# Patient Record
Sex: MALE | Race: WHITE | NOT HISPANIC OR LATINO | Employment: OTHER | URBAN - METROPOLITAN AREA
[De-identification: names, ages, dates, MRNs, and addresses within clinical notes are randomized per-mention and may not be internally consistent; named-entity substitution may affect disease eponyms.]

---

## 2023-03-01 LAB
ALANINE AMINOTRANSFERASE (SGPT) (U/L) IN SER/PLAS: 22 U/L (ref 10–52)
ALBUMIN (G/DL) IN SER/PLAS: 4.5 G/DL (ref 3.4–5)
ALKALINE PHOSPHATASE (U/L) IN SER/PLAS: 80 U/L (ref 33–136)
ANION GAP IN SER/PLAS: 14 MMOL/L (ref 10–20)
APPEARANCE, URINE: ABNORMAL
ASPARTATE AMINOTRANSFERASE (SGOT) (U/L) IN SER/PLAS: 24 U/L (ref 9–39)
BASOPHILS (10*3/UL) IN BLOOD BY AUTOMATED COUNT: 0.03 X10E9/L (ref 0–0.1)
BASOPHILS/100 LEUKOCYTES IN BLOOD BY AUTOMATED COUNT: 0.6 % (ref 0–2)
BILIRUBIN TOTAL (MG/DL) IN SER/PLAS: 0.9 MG/DL (ref 0–1.2)
BILIRUBIN, URINE: NEGATIVE
BLOOD, URINE: NEGATIVE
CALCIDIOL (25 OH VITAMIN D3) (NG/ML) IN SER/PLAS: 65 NG/ML
CALCIUM (MG/DL) IN SER/PLAS: 9.5 MG/DL (ref 8.6–10.6)
CARBON DIOXIDE, TOTAL (MMOL/L) IN SER/PLAS: 26 MMOL/L (ref 21–32)
CHLORIDE (MMOL/L) IN SER/PLAS: 107 MMOL/L (ref 98–107)
CHOLESTEROL (MG/DL) IN SER/PLAS: 244 MG/DL (ref 0–199)
CHOLESTEROL IN HDL (MG/DL) IN SER/PLAS: 50.3 MG/DL
CHOLESTEROL/HDL RATIO: 4.9
COBALAMIN (VITAMIN B12) (PG/ML) IN SER/PLAS: 586 PG/ML (ref 211–911)
COLOR, URINE: ABNORMAL
CREATININE (MG/DL) IN SER/PLAS: 1 MG/DL (ref 0.5–1.3)
EOSINOPHILS (10*3/UL) IN BLOOD BY AUTOMATED COUNT: 0.29 X10E9/L (ref 0–0.7)
EOSINOPHILS/100 LEUKOCYTES IN BLOOD BY AUTOMATED COUNT: 5.6 % (ref 0–6)
ERYTHROCYTE DISTRIBUTION WIDTH (RATIO) BY AUTOMATED COUNT: 12.3 % (ref 11.5–14.5)
ERYTHROCYTE MEAN CORPUSCULAR HEMOGLOBIN CONCENTRATION (G/DL) BY AUTOMATED: 33.8 G/DL (ref 32–36)
ERYTHROCYTE MEAN CORPUSCULAR VOLUME (FL) BY AUTOMATED COUNT: 94 FL (ref 80–100)
ERYTHROCYTES (10*6/UL) IN BLOOD BY AUTOMATED COUNT: 4.87 X10E12/L (ref 4.5–5.9)
FOLATE (NG/ML) IN SER/PLAS: >24 NG/ML
GFR MALE: 84 ML/MIN/1.73M2
GLUCOSE (MG/DL) IN SER/PLAS: 92 MG/DL (ref 74–99)
GLUCOSE, URINE: NEGATIVE MG/DL
HEMATOCRIT (%) IN BLOOD BY AUTOMATED COUNT: 45.8 % (ref 41–52)
HEMOGLOBIN (G/DL) IN BLOOD: 15.5 G/DL (ref 13.5–17.5)
IMMATURE GRANULOCYTES/100 LEUKOCYTES IN BLOOD BY AUTOMATED COUNT: 0.2 % (ref 0–0.9)
KETONES, URINE: ABNORMAL MG/DL
LDL: 161 MG/DL (ref 0–99)
LEUKOCYTE ESTERASE, URINE: NEGATIVE
LEUKOCYTES (10*3/UL) IN BLOOD BY AUTOMATED COUNT: 5.2 X10E9/L (ref 4.4–11.3)
LYMPHOCYTES (10*3/UL) IN BLOOD BY AUTOMATED COUNT: 1.84 X10E9/L (ref 1.2–4.8)
LYMPHOCYTES/100 LEUKOCYTES IN BLOOD BY AUTOMATED COUNT: 35.3 % (ref 13–44)
MONOCYTES (10*3/UL) IN BLOOD BY AUTOMATED COUNT: 0.39 X10E9/L (ref 0.1–1)
MONOCYTES/100 LEUKOCYTES IN BLOOD BY AUTOMATED COUNT: 7.5 % (ref 2–10)
NEUTROPHILS (10*3/UL) IN BLOOD BY AUTOMATED COUNT: 2.65 X10E9/L (ref 1.2–7.7)
NEUTROPHILS/100 LEUKOCYTES IN BLOOD BY AUTOMATED COUNT: 50.8 % (ref 40–80)
NITRITE, URINE: NEGATIVE
NRBC (PER 100 WBCS) BY AUTOMATED COUNT: 0 /100 WBC (ref 0–0)
PH, URINE: 5 (ref 5–8)
PLATELETS (10*3/UL) IN BLOOD AUTOMATED COUNT: 211 X10E9/L (ref 150–450)
POTASSIUM (MMOL/L) IN SER/PLAS: 4.3 MMOL/L (ref 3.5–5.3)
PROSTATE SPECIFIC AG (NG/ML) IN SER/PLAS: 1.1 NG/ML (ref 0–4)
PROTEIN TOTAL: 6.9 G/DL (ref 6.4–8.2)
PROTEIN, URINE: NEGATIVE MG/DL
SODIUM (MMOL/L) IN SER/PLAS: 143 MMOL/L (ref 136–145)
SPECIFIC GRAVITY, URINE: 1.03 (ref 1–1.03)
THYROTROPIN (MIU/L) IN SER/PLAS BY DETECTION LIMIT <= 0.05 MIU/L: 2.32 MIU/L (ref 0.44–3.98)
TRIGLYCERIDE (MG/DL) IN SER/PLAS: 164 MG/DL (ref 0–149)
UREA NITROGEN (MG/DL) IN SER/PLAS: 19 MG/DL (ref 6–23)
UROBILINOGEN, URINE: <2 MG/DL (ref 0–1.9)
VLDL: 33 MG/DL (ref 0–40)

## 2023-04-03 LAB
ALANINE AMINOTRANSFERASE (SGPT) (U/L) IN SER/PLAS: 16 U/L (ref 10–52)
ALBUMIN (G/DL) IN SER/PLAS: 4.2 G/DL (ref 3.4–5)
ALKALINE PHOSPHATASE (U/L) IN SER/PLAS: 65 U/L (ref 33–136)
ANION GAP IN SER/PLAS: 10 MMOL/L (ref 10–20)
ASPARTATE AMINOTRANSFERASE (SGOT) (U/L) IN SER/PLAS: 19 U/L (ref 9–39)
BILIRUBIN TOTAL (MG/DL) IN SER/PLAS: 0.7 MG/DL (ref 0–1.2)
CALCIDIOL (25 OH VITAMIN D3) (NG/ML) IN SER/PLAS: 57 NG/ML
CALCIUM (MG/DL) IN SER/PLAS: 9.2 MG/DL (ref 8.6–10.3)
CARBON DIOXIDE, TOTAL (MMOL/L) IN SER/PLAS: 27 MMOL/L (ref 21–32)
CHLORIDE (MMOL/L) IN SER/PLAS: 106 MMOL/L (ref 98–107)
CREATININE (MG/DL) IN SER/PLAS: 0.9 MG/DL (ref 0.5–1.3)
GFR MALE: >90 ML/MIN/1.73M2
GLUCOSE (MG/DL) IN SER/PLAS: 83 MG/DL (ref 74–99)
POTASSIUM (MMOL/L) IN SER/PLAS: 4.3 MMOL/L (ref 3.5–5.3)
PROTEIN TOTAL: 6.4 G/DL (ref 6.4–8.2)
SODIUM (MMOL/L) IN SER/PLAS: 139 MMOL/L (ref 136–145)
THYROTROPIN (MIU/L) IN SER/PLAS BY DETECTION LIMIT <= 0.05 MIU/L: 3.16 MIU/L (ref 0.44–3.98)
THYROXINE (T4) FREE (NG/DL) IN SER/PLAS: 0.9 NG/DL (ref 0.61–1.12)
TRIIODOTHYRONINE (T3) FREE (PG/ML) IN SER/PLAS: 3.5 PG/ML (ref 2.3–4.2)
UREA NITROGEN (MG/DL) IN SER/PLAS: 21 MG/DL (ref 6–23)

## 2023-04-27 ENCOUNTER — TELEPHONE (OUTPATIENT)
Dept: PRIMARY CARE | Facility: CLINIC | Age: 63
End: 2023-04-27
Payer: COMMERCIAL

## 2023-04-27 DIAGNOSIS — B35.6 TINEA CRURIS: Primary | ICD-10-CM

## 2023-04-27 DIAGNOSIS — Z00.00 ROUTINE GENERAL MEDICAL EXAMINATION AT A HEALTH CARE FACILITY: ICD-10-CM

## 2023-04-27 PROBLEM — N40.0 BPH (BENIGN PROSTATIC HYPERPLASIA): Status: ACTIVE | Noted: 2023-04-27

## 2023-04-27 PROBLEM — R09.89 THROAT FULLNESS: Status: ACTIVE | Noted: 2023-04-27

## 2023-04-27 PROBLEM — E06.9 THYROIDITIS: Status: ACTIVE | Noted: 2023-04-27

## 2023-04-27 PROBLEM — I48.91 ATRIAL FIBRILLATION (MULTI): Status: ACTIVE | Noted: 2023-04-27

## 2023-04-27 PROBLEM — M79.2 NEUROPATHIC PAIN: Status: ACTIVE | Noted: 2023-04-27

## 2023-04-27 PROBLEM — E55.9 VITAMIN D DEFICIENCY: Status: ACTIVE | Noted: 2023-04-27

## 2023-04-27 PROBLEM — U07.1 COVID-19: Status: ACTIVE | Noted: 2023-04-27

## 2023-04-27 PROBLEM — R53.83 FATIGUE: Status: ACTIVE | Noted: 2023-04-27

## 2023-04-27 PROBLEM — E78.5 HYPERLIPIDEMIA: Status: ACTIVE | Noted: 2023-04-27

## 2023-04-27 PROBLEM — E05.90 HYPERTHYROIDISM: Status: ACTIVE | Noted: 2023-04-27

## 2023-04-27 PROBLEM — I35.1 MODERATE AORTIC VALVE INSUFFICIENCY: Status: ACTIVE | Noted: 2023-04-27

## 2023-04-27 PROBLEM — G47.33 OSA (OBSTRUCTIVE SLEEP APNEA): Status: ACTIVE | Noted: 2023-04-27

## 2023-04-27 PROBLEM — J06.9 VIRAL UPPER RESPIRATORY TRACT INFECTION: Status: ACTIVE | Noted: 2023-04-27

## 2023-04-27 PROBLEM — T78.40XA ALLERGIES: Status: ACTIVE | Noted: 2023-04-27

## 2023-04-27 PROBLEM — M19.90 INFLAMMATORY ARTHRITIS: Status: ACTIVE | Noted: 2023-04-27

## 2023-04-27 PROBLEM — E53.8 VITAMIN B 12 DEFICIENCY: Status: ACTIVE | Noted: 2023-04-27

## 2023-04-27 PROBLEM — K21.9 LARYNGOPHARYNGEAL REFLUX: Status: ACTIVE | Noted: 2023-04-27

## 2023-04-27 PROBLEM — M79.7 FIBROMYALGIA: Status: ACTIVE | Noted: 2023-04-27

## 2023-04-27 PROBLEM — R00.1 BRADYCARDIA, SINUS: Status: ACTIVE | Noted: 2023-04-27

## 2023-04-27 PROBLEM — Z20.822 SUSPECTED COVID-19 VIRUS INFECTION: Status: ACTIVE | Noted: 2023-04-27

## 2023-04-27 PROBLEM — I48.0 PAROXYSMAL ATRIAL FIBRILLATION (MULTI): Status: ACTIVE | Noted: 2023-04-27

## 2023-04-27 PROBLEM — J35.1 LINGUAL TONSIL HYPERTROPHY: Status: ACTIVE | Noted: 2023-04-27

## 2023-04-27 PROBLEM — R22.42 SUBCUTANEOUS MASS OF LEFT LOWER EXTREMITY: Status: ACTIVE | Noted: 2023-04-27

## 2023-04-27 PROBLEM — S81.819A LACERATION OF LEG: Status: ACTIVE | Noted: 2023-04-27

## 2023-04-27 PROBLEM — J02.9 SORE THROAT: Status: ACTIVE | Noted: 2023-04-27

## 2023-04-27 PROBLEM — J02.9 ACUTE VIRAL PHARYNGITIS: Status: ACTIVE | Noted: 2023-04-27

## 2023-04-27 RX ORDER — CEPHALEXIN 500 MG/1
500 CAPSULE ORAL 3 TIMES DAILY
COMMUNITY
Start: 2022-12-16 | End: 2023-04-27 | Stop reason: SDUPTHER

## 2023-04-27 RX ORDER — ECONAZOLE NITRATE 10 MG/G
1 CREAM TOPICAL 2 TIMES DAILY
Qty: 6 G | Refills: 0 | Status: SHIPPED | OUTPATIENT
Start: 2023-04-27 | End: 2023-05-27

## 2023-04-27 RX ORDER — ECONAZOLE NITRATE 10 MG/G
1 CREAM TOPICAL 2 TIMES DAILY
COMMUNITY
Start: 2022-12-16 | End: 2023-04-27 | Stop reason: SDUPTHER

## 2023-04-27 RX ORDER — CEPHALEXIN 500 MG/1
500 CAPSULE ORAL 3 TIMES DAILY
Qty: 30 CAPSULE | Refills: 0 | Status: SHIPPED | OUTPATIENT
Start: 2023-04-27 | End: 2023-05-07

## 2023-04-27 RX ORDER — MESALAMINE 400 MG/1
CAPSULE, DELAYED RELEASE ORAL
COMMUNITY
Start: 2023-03-30

## 2023-04-27 RX ORDER — EPINEPHRINE 0.3 MG/.3ML
0.3 INJECTION SUBCUTANEOUS
COMMUNITY
Start: 2015-12-01

## 2023-04-27 RX ORDER — ASPIRIN 81 MG/1
1 TABLET ORAL DAILY
COMMUNITY

## 2023-06-26 ENCOUNTER — TELEPHONE (OUTPATIENT)
Dept: PRIMARY CARE | Facility: CLINIC | Age: 63
End: 2023-06-26
Payer: COMMERCIAL

## 2023-10-03 ENCOUNTER — LAB (OUTPATIENT)
Dept: LAB | Facility: LAB | Age: 63
End: 2023-10-03
Payer: COMMERCIAL

## 2023-10-03 DIAGNOSIS — E78.5 HYPERLIPIDEMIA, UNSPECIFIED: ICD-10-CM

## 2023-10-03 DIAGNOSIS — E03.9 HYPOTHYROIDISM, UNSPECIFIED: ICD-10-CM

## 2023-10-03 DIAGNOSIS — E11.9 TYPE 2 DIABETES MELLITUS WITHOUT COMPLICATIONS (MULTI): Primary | ICD-10-CM

## 2023-10-03 DIAGNOSIS — E55.9 VITAMIN D DEFICIENCY, UNSPECIFIED: ICD-10-CM

## 2023-10-03 LAB
25(OH)D3 SERPL-MCNC: 55 NG/ML (ref 30–100)
ALBUMIN SERPL BCP-MCNC: 3.9 G/DL (ref 3.4–5)
ALP SERPL-CCNC: 71 U/L (ref 33–136)
ALT SERPL W P-5'-P-CCNC: 21 U/L (ref 10–52)
ANION GAP SERPL CALC-SCNC: 12 MMOL/L (ref 10–20)
AST SERPL W P-5'-P-CCNC: 18 U/L (ref 9–39)
BILIRUB SERPL-MCNC: 0.9 MG/DL (ref 0–1.2)
BUN SERPL-MCNC: 24 MG/DL (ref 6–23)
CALCIUM SERPL-MCNC: 9.1 MG/DL (ref 8.6–10.3)
CHLORIDE SERPL-SCNC: 104 MMOL/L (ref 98–107)
CHOLEST SERPL-MCNC: 217 MG/DL (ref 0–199)
CHOLESTEROL/HDL RATIO: 3.7
CO2 SERPL-SCNC: 27 MMOL/L (ref 21–32)
CREAT SERPL-MCNC: 1.05 MG/DL (ref 0.5–1.3)
EST. AVERAGE GLUCOSE BLD GHB EST-MCNC: 111 MG/DL
GFR SERPL CREATININE-BSD FRML MDRD: 79 ML/MIN/1.73M*2
GLUCOSE SERPL-MCNC: 93 MG/DL (ref 74–99)
HBA1C MFR BLD: 5.5 %
HDLC SERPL-MCNC: 58.7 MG/DL
LDLC SERPL CALC-MCNC: 121 MG/DL (ref 140–190)
NON HDL CHOLESTEROL: 158 MG/DL (ref 0–149)
POTASSIUM SERPL-SCNC: 4.5 MMOL/L (ref 3.5–5.3)
PROT SERPL-MCNC: 5.9 G/DL (ref 6.4–8.2)
SODIUM SERPL-SCNC: 138 MMOL/L (ref 136–145)
T3FREE SERPL-MCNC: 3 PG/ML (ref 2.3–4.2)
T4 FREE SERPL-MCNC: 1.11 NG/DL (ref 0.61–1.12)
TRIGL SERPL-MCNC: 185 MG/DL (ref 0–149)
TSH SERPL-ACNC: 1.19 MIU/L (ref 0.44–3.98)
VLDL: 37 MG/DL (ref 0–40)

## 2023-10-03 PROCEDURE — 36415 COLL VENOUS BLD VENIPUNCTURE: CPT

## 2023-11-08 ENCOUNTER — APPOINTMENT (OUTPATIENT)
Dept: ORTHOPEDIC SURGERY | Facility: CLINIC | Age: 63
End: 2023-11-08
Payer: COMMERCIAL

## 2024-01-29 ENCOUNTER — TELEPHONE (OUTPATIENT)
Dept: CARDIOLOGY | Facility: CLINIC | Age: 64
End: 2024-01-29
Payer: MEDICARE

## 2024-01-29 DIAGNOSIS — I35.1 MODERATE AORTIC VALVE INSUFFICIENCY: ICD-10-CM

## 2024-01-29 NOTE — TELEPHONE ENCOUNTER
Patient called in and scheduled his yearly follow up.  He was also trying to schedule his Echo but there is not an order is the system.

## 2024-02-07 ENCOUNTER — OFFICE VISIT (OUTPATIENT)
Dept: ORTHOPEDIC SURGERY | Facility: CLINIC | Age: 64
End: 2024-02-07
Payer: MEDICARE

## 2024-02-07 VITALS — WEIGHT: 170 LBS | BODY MASS INDEX: 24.34 KG/M2 | HEIGHT: 70 IN

## 2024-02-07 DIAGNOSIS — M23.90 INTERNAL DERANGEMENT OF KNEE, UNSPECIFIED LATERALITY: Primary | ICD-10-CM

## 2024-02-07 PROCEDURE — 99213 OFFICE O/P EST LOW 20 MIN: CPT | Performed by: ORTHOPAEDIC SURGERY

## 2024-02-07 PROCEDURE — 1036F TOBACCO NON-USER: CPT | Performed by: ORTHOPAEDIC SURGERY

## 2024-02-07 PROCEDURE — 1159F MED LIST DOCD IN RCRD: CPT | Performed by: ORTHOPAEDIC SURGERY

## 2024-02-07 PROCEDURE — 1125F AMNT PAIN NOTED PAIN PRSNT: CPT | Performed by: ORTHOPAEDIC SURGERY

## 2024-02-07 ASSESSMENT — PAIN DESCRIPTION - DESCRIPTORS: DESCRIPTORS: ACHING

## 2024-02-07 ASSESSMENT — PAIN - FUNCTIONAL ASSESSMENT: PAIN_FUNCTIONAL_ASSESSMENT: 0-10

## 2024-02-07 ASSESSMENT — PAIN SCALES - GENERAL: PAINLEVEL_OUTOF10: 3

## 2024-02-07 NOTE — PROGRESS NOTES
Returns for left knee.  Still having pain in the posterior lateral aspect the knee.  Did feel better on the prednisone.  Did feel Better while sick with the flu and unable to be that active.  Pain is returned and limiting daily activities.    Exam: Unchanged.    Assessment: Posterior lateral knee pain question meniscal tear/internal derangement.    Plan: Discussed nonoperative and operative options in detail.   Risk and benefits discussed in detail. All questions answered today.  Recovery timeline and expectations discussed in detail.  X-rays are pretty unremarkable.  Recommend MRI to evaluate meniscus for possible arthroscopy.  Consider injections depending on MRI results.

## 2024-02-16 ENCOUNTER — HOSPITAL ENCOUNTER (OUTPATIENT)
Dept: RADIOLOGY | Facility: CLINIC | Age: 64
Discharge: HOME | End: 2024-02-16
Payer: MEDICARE

## 2024-02-16 DIAGNOSIS — M23.90 INTERNAL DERANGEMENT OF KNEE, UNSPECIFIED LATERALITY: ICD-10-CM

## 2024-02-16 PROCEDURE — 73721 MRI JNT OF LWR EXTRE W/O DYE: CPT | Mod: LEFT SIDE | Performed by: RADIOLOGY

## 2024-02-16 PROCEDURE — 73721 MRI JNT OF LWR EXTRE W/O DYE: CPT | Mod: LT

## 2024-02-28 ENCOUNTER — OFFICE VISIT (OUTPATIENT)
Dept: ORTHOPEDIC SURGERY | Facility: CLINIC | Age: 64
End: 2024-02-28
Payer: MEDICARE

## 2024-02-28 ENCOUNTER — PREP FOR PROCEDURE (OUTPATIENT)
Dept: ORTHOPEDIC SURGERY | Facility: CLINIC | Age: 64
End: 2024-02-28

## 2024-02-28 VITALS — BODY MASS INDEX: 25.05 KG/M2 | HEIGHT: 70 IN | WEIGHT: 175 LBS

## 2024-02-28 DIAGNOSIS — M23.92 INTERNAL DERANGEMENT OF LEFT KNEE: Primary | ICD-10-CM

## 2024-02-28 DIAGNOSIS — M23.90 INTERNAL DERANGEMENT OF KNEE, UNSPECIFIED LATERALITY: Primary | ICD-10-CM

## 2024-02-28 PROCEDURE — 1125F AMNT PAIN NOTED PAIN PRSNT: CPT | Performed by: ORTHOPAEDIC SURGERY

## 2024-02-28 PROCEDURE — 1036F TOBACCO NON-USER: CPT | Performed by: ORTHOPAEDIC SURGERY

## 2024-02-28 PROCEDURE — 99214 OFFICE O/P EST MOD 30 MIN: CPT | Performed by: ORTHOPAEDIC SURGERY

## 2024-02-28 PROCEDURE — 1159F MED LIST DOCD IN RCRD: CPT | Performed by: ORTHOPAEDIC SURGERY

## 2024-02-28 RX ORDER — SODIUM CHLORIDE, SODIUM LACTATE, POTASSIUM CHLORIDE, CALCIUM CHLORIDE 600; 310; 30; 20 MG/100ML; MG/100ML; MG/100ML; MG/100ML
100 INJECTION, SOLUTION INTRAVENOUS CONTINUOUS
Status: CANCELLED | OUTPATIENT
Start: 2024-02-28

## 2024-02-28 RX ORDER — CEFAZOLIN SODIUM 2 G/100ML
2 INJECTION, SOLUTION INTRAVENOUS ONCE
Status: CANCELLED | OUTPATIENT
Start: 2024-02-28 | End: 2024-02-28

## 2024-02-28 ASSESSMENT — PAIN SCALES - GENERAL: PAINLEVEL: 3

## 2024-02-28 NOTE — PROGRESS NOTES
Here for MRI follow-up.  Still having persistent pain in the left knee.  Has been going on for about 10 months.    Exam: Unchanged.    I personally reviewed the following radiographic exams: MRI shows very mild arthrosis medial compartment.  Normal meniscus.  Mild effusion.    Assessment: Internal derangement left knee.    Plan: Discussed nonoperative and operative options in detail.   Risk and benefits discussed in detail. All questions answered today.  Recovery timeline and expectations discussed in detail.  Discussed knee arthroscopy.  Do not feel he has enough arthritis or fusion for an injection of cortisone or gel.  I think arthroscopy for likely plica or some mechanical issue in the knee is very reasonable.  We discussed postop recovery.  Left knee arthroscopy 90675. Crutches. Outpatient. ANesthesia: General and local.  Crutches.  30 minutes

## 2024-02-28 NOTE — H&P
History Of Present Illness  Cali Jones is a 63 y.o. male presenting with knee pain.     Past Medical History  He has a past medical history of Noninfective gastroenteritis and colitis, unspecified, Other specified personal risk factors, not elsewhere classified (08/01/2014), Personal history of other diseases of the nervous system and sense organs, and Streptococcal pharyngitis (11/30/2018).    Surgical History  He has a past surgical history that includes Hemorrhoid surgery (07/31/2014); Other surgical history (07/31/2014); and Other surgical history (07/31/2014).     Social History  He reports that he has never smoked. He has never been exposed to tobacco smoke. He has never used smokeless tobacco. He reports that he does not currently use alcohol. He reports that he does not use drugs.    Family History  No family history on file.     Allergies  Bee venom protein (honey bee)    Review of Systems     Physical Exam     Last Recorded Vitals  There were no vitals taken for this visit.    Relevant Results      Scheduled medications    Continuous medications    PRN medications    No results found for this or any previous visit (from the past 24 hour(s)).    Assessment/Plan   Internal derangement left knee    Plan knee arthroscopy.       José Luis Dotson MD

## 2024-03-01 PROBLEM — M23.92 INTERNAL DERANGEMENT OF LEFT KNEE: Status: ACTIVE | Noted: 2024-02-28

## 2024-03-06 ENCOUNTER — APPOINTMENT (OUTPATIENT)
Dept: ORTHOPEDIC SURGERY | Facility: CLINIC | Age: 64
End: 2024-03-06
Payer: MEDICARE

## 2024-03-07 ENCOUNTER — TELEMEDICINE CLINICAL SUPPORT (OUTPATIENT)
Dept: PREADMISSION TESTING | Facility: HOSPITAL | Age: 64
End: 2024-03-07
Payer: MEDICARE

## 2024-03-07 DIAGNOSIS — M23.92 INTERNAL DERANGEMENT OF LEFT KNEE: ICD-10-CM

## 2024-03-07 RX ORDER — ASCORBIC ACID 1000 MG
175 TABLET ORAL DAILY
COMMUNITY

## 2024-03-07 RX ORDER — VIT C/E/ZN/COPPR/LUTEIN/ZEAXAN 250MG-90MG
25 CAPSULE ORAL DAILY
COMMUNITY

## 2024-03-07 RX ORDER — PREDNISONE 5 MG/1
5 TABLET ORAL AS NEEDED
COMMUNITY

## 2024-03-07 RX ORDER — ZINC GLUCONATE 50 MG
TABLET ORAL DAILY
COMMUNITY

## 2024-03-07 RX ORDER — BISMUTH SUBSALICYLATE 262 MG
1 TABLET,CHEWABLE ORAL DAILY
COMMUNITY

## 2024-03-07 RX ORDER — ASCORBIC ACID 500 MG
500 TABLET ORAL DAILY
COMMUNITY

## 2024-03-08 ENCOUNTER — LAB (OUTPATIENT)
Dept: LAB | Facility: LAB | Age: 64
End: 2024-03-08
Payer: MEDICARE

## 2024-03-08 ENCOUNTER — HOSPITAL ENCOUNTER (OUTPATIENT)
Dept: RADIOLOGY | Facility: CLINIC | Age: 64
End: 2024-03-08
Payer: MEDICARE

## 2024-03-08 ENCOUNTER — OFFICE VISIT (OUTPATIENT)
Dept: PRIMARY CARE | Facility: CLINIC | Age: 64
End: 2024-03-08
Payer: MEDICARE

## 2024-03-08 VITALS — WEIGHT: 172 LBS | DIASTOLIC BLOOD PRESSURE: 65 MMHG | SYSTOLIC BLOOD PRESSURE: 120 MMHG | BODY MASS INDEX: 24.68 KG/M2

## 2024-03-08 DIAGNOSIS — E78.01 FAMILIAL HYPERCHOLESTEROLEMIA: ICD-10-CM

## 2024-03-08 DIAGNOSIS — E55.9 VITAMIN D DEFICIENCY: ICD-10-CM

## 2024-03-08 DIAGNOSIS — E53.8 VITAMIN B 12 DEFICIENCY: ICD-10-CM

## 2024-03-08 DIAGNOSIS — E05.90 HYPERTHYROIDISM: ICD-10-CM

## 2024-03-08 DIAGNOSIS — Z12.5 SCREENING FOR PROSTATE CANCER: ICD-10-CM

## 2024-03-08 DIAGNOSIS — E11.9 TYPE 2 DIABETES MELLITUS WITHOUT COMPLICATION, WITHOUT LONG-TERM CURRENT USE OF INSULIN (MULTI): ICD-10-CM

## 2024-03-08 DIAGNOSIS — E21.1 SECONDARY HYPERPARATHYROIDISM, NOT ELSEWHERE CLASSIFIED (MULTI): ICD-10-CM

## 2024-03-08 DIAGNOSIS — Z00.00 ROUTINE GENERAL MEDICAL EXAMINATION AT HEALTH CARE FACILITY: Primary | ICD-10-CM

## 2024-03-08 DIAGNOSIS — K51.50 LEFT SIDED COLITIS WITHOUT COMPLICATIONS (MULTI): ICD-10-CM

## 2024-03-08 DIAGNOSIS — Z13.6 SCREENING FOR CARDIOVASCULAR CONDITION: ICD-10-CM

## 2024-03-08 DIAGNOSIS — I48.0 PAROXYSMAL ATRIAL FIBRILLATION (MULTI): ICD-10-CM

## 2024-03-08 DIAGNOSIS — M81.0 AGE RELATED OSTEOPOROSIS, UNSPECIFIED PATHOLOGICAL FRACTURE PRESENCE: ICD-10-CM

## 2024-03-08 DIAGNOSIS — Z23 NEED FOR VACCINATION: ICD-10-CM

## 2024-03-08 LAB
25(OH)D3 SERPL-MCNC: 51 NG/ML (ref 30–100)
ALBUMIN SERPL BCP-MCNC: 4.4 G/DL (ref 3.4–5)
ALP SERPL-CCNC: 71 U/L (ref 33–136)
ALT SERPL W P-5'-P-CCNC: 15 U/L (ref 10–52)
ANION GAP SERPL CALC-SCNC: 11 MMOL/L (ref 10–20)
AST SERPL W P-5'-P-CCNC: 19 U/L (ref 9–39)
BASOPHILS # BLD AUTO: 0.04 X10*3/UL (ref 0–0.1)
BASOPHILS NFR BLD AUTO: 0.9 %
BILIRUB SERPL-MCNC: 0.4 MG/DL (ref 0–1.2)
BUN SERPL-MCNC: 22 MG/DL (ref 6–23)
CALCIUM SERPL-MCNC: 9.3 MG/DL (ref 8.6–10.6)
CHLORIDE SERPL-SCNC: 109 MMOL/L (ref 98–107)
CHOLEST SERPL-MCNC: 244 MG/DL (ref 0–199)
CHOLESTEROL/HDL RATIO: 4.9
CO2 SERPL-SCNC: 28 MMOL/L (ref 21–32)
CREAT SERPL-MCNC: 0.99 MG/DL (ref 0.5–1.3)
CREAT UR-MCNC: 138.2 MG/DL (ref 20–370)
EGFRCR SERPLBLD CKD-EPI 2021: 85 ML/MIN/1.73M*2
EOSINOPHIL # BLD AUTO: 0.35 X10*3/UL (ref 0–0.7)
EOSINOPHIL NFR BLD AUTO: 7.9 %
ERYTHROCYTE [DISTWIDTH] IN BLOOD BY AUTOMATED COUNT: 12.3 % (ref 11.5–14.5)
EST. AVERAGE GLUCOSE BLD GHB EST-MCNC: 103 MG/DL
GLUCOSE SERPL-MCNC: 87 MG/DL (ref 74–99)
HBA1C MFR BLD: 5.2 %
HCT VFR BLD AUTO: 44.4 % (ref 41–52)
HDLC SERPL-MCNC: 49.9 MG/DL
HGB BLD-MCNC: 14.9 G/DL (ref 13.5–17.5)
IMM GRANULOCYTES # BLD AUTO: 0.01 X10*3/UL (ref 0–0.7)
IMM GRANULOCYTES NFR BLD AUTO: 0.2 % (ref 0–0.9)
LDLC SERPL CALC-MCNC: 172 MG/DL
LYMPHOCYTES # BLD AUTO: 1.81 X10*3/UL (ref 1.2–4.8)
LYMPHOCYTES NFR BLD AUTO: 40.8 %
MCH RBC QN AUTO: 31.7 PG (ref 26–34)
MCHC RBC AUTO-ENTMCNC: 33.6 G/DL (ref 32–36)
MCV RBC AUTO: 95 FL (ref 80–100)
MICROALBUMIN UR-MCNC: <7 MG/L
MICROALBUMIN/CREAT UR: NORMAL MG/G{CREAT}
MONOCYTES # BLD AUTO: 0.36 X10*3/UL (ref 0.1–1)
MONOCYTES NFR BLD AUTO: 8.1 %
NEUTROPHILS # BLD AUTO: 1.87 X10*3/UL (ref 1.2–7.7)
NEUTROPHILS NFR BLD AUTO: 42.1 %
NON HDL CHOLESTEROL: 194 MG/DL (ref 0–149)
NRBC BLD-RTO: 0 /100 WBCS (ref 0–0)
PLATELET # BLD AUTO: 187 X10*3/UL (ref 150–450)
POTASSIUM SERPL-SCNC: 4.3 MMOL/L (ref 3.5–5.3)
PROT SERPL-MCNC: 6.4 G/DL (ref 6.4–8.2)
PSA SERPL-MCNC: 1.14 NG/ML
RBC # BLD AUTO: 4.7 X10*6/UL (ref 4.5–5.9)
SODIUM SERPL-SCNC: 144 MMOL/L (ref 136–145)
TRIGL SERPL-MCNC: 112 MG/DL (ref 0–149)
VIT B12 SERPL-MCNC: 494 PG/ML (ref 211–911)
VLDL: 22 MG/DL (ref 0–40)
WBC # BLD AUTO: 4.4 X10*3/UL (ref 4.4–11.3)

## 2024-03-08 PROCEDURE — 36415 COLL VENOUS BLD VENIPUNCTURE: CPT

## 2024-03-08 PROCEDURE — 82306 VITAMIN D 25 HYDROXY: CPT

## 2024-03-08 PROCEDURE — 3074F SYST BP LT 130 MM HG: CPT | Performed by: FAMILY MEDICINE

## 2024-03-08 PROCEDURE — 1170F FXNL STATUS ASSESSED: CPT | Performed by: FAMILY MEDICINE

## 2024-03-08 PROCEDURE — 80053 COMPREHEN METABOLIC PANEL: CPT

## 2024-03-08 PROCEDURE — 82607 VITAMIN B-12: CPT

## 2024-03-08 PROCEDURE — 80061 LIPID PANEL: CPT

## 2024-03-08 PROCEDURE — G0403 EKG FOR INITIAL PREVENT EXAM: HCPCS | Performed by: FAMILY MEDICINE

## 2024-03-08 PROCEDURE — G0009 ADMIN PNEUMOCOCCAL VACCINE: HCPCS | Performed by: FAMILY MEDICINE

## 2024-03-08 PROCEDURE — 3078F DIAST BP <80 MM HG: CPT | Performed by: FAMILY MEDICINE

## 2024-03-08 PROCEDURE — 85025 COMPLETE CBC W/AUTO DIFF WBC: CPT

## 2024-03-08 PROCEDURE — G0402 INITIAL PREVENTIVE EXAM: HCPCS | Performed by: FAMILY MEDICINE

## 2024-03-08 PROCEDURE — 99214 OFFICE O/P EST MOD 30 MIN: CPT | Performed by: FAMILY MEDICINE

## 2024-03-08 PROCEDURE — 90677 PCV20 VACCINE IM: CPT | Performed by: FAMILY MEDICINE

## 2024-03-08 PROCEDURE — 1159F MED LIST DOCD IN RCRD: CPT | Performed by: FAMILY MEDICINE

## 2024-03-08 PROCEDURE — 99497 ADVNCD CARE PLAN 30 MIN: CPT | Performed by: FAMILY MEDICINE

## 2024-03-08 PROCEDURE — 82570 ASSAY OF URINE CREATININE: CPT

## 2024-03-08 PROCEDURE — 82043 UR ALBUMIN QUANTITATIVE: CPT

## 2024-03-08 PROCEDURE — G0103 PSA SCREENING: HCPCS

## 2024-03-08 PROCEDURE — 1036F TOBACCO NON-USER: CPT | Performed by: FAMILY MEDICINE

## 2024-03-08 PROCEDURE — G0446 INTENS BEHAVE THER CARDIO DX: HCPCS | Performed by: FAMILY MEDICINE

## 2024-03-08 PROCEDURE — 83036 HEMOGLOBIN GLYCOSYLATED A1C: CPT

## 2024-03-08 PROCEDURE — 1160F RVW MEDS BY RX/DR IN RCRD: CPT | Performed by: FAMILY MEDICINE

## 2024-03-08 PROCEDURE — 1125F AMNT PAIN NOTED PAIN PRSNT: CPT | Performed by: FAMILY MEDICINE

## 2024-03-08 ASSESSMENT — ACTIVITIES OF DAILY LIVING (ADL)
MANAGING_FINANCES: INDEPENDENT
TAKING_MEDICATION: INDEPENDENT
DRESSING: INDEPENDENT
BATHING: INDEPENDENT
MANAGING_FINANCES: INDEPENDENT
GROCERY_SHOPPING: INDEPENDENT
GROCERY_SHOPPING: INDEPENDENT
DRESSING: INDEPENDENT
DOING_HOUSEWORK: INDEPENDENT
TAKING_MEDICATION: INDEPENDENT
BATHING: INDEPENDENT
DOING_HOUSEWORK: INDEPENDENT

## 2024-03-08 ASSESSMENT — PATIENT HEALTH QUESTIONNAIRE - PHQ9
2. FEELING DOWN, DEPRESSED OR HOPELESS: NOT AT ALL
SUM OF ALL RESPONSES TO PHQ9 QUESTIONS 1 AND 2: 0
1. LITTLE INTEREST OR PLEASURE IN DOING THINGS: NOT AT ALL

## 2024-03-08 ASSESSMENT — ENCOUNTER SYMPTOMS
LOSS OF SENSATION IN FEET: 0
DEPRESSION: 0
OCCASIONAL FEELINGS OF UNSTEADINESS: 0

## 2024-03-08 NOTE — PROGRESS NOTES
Subjective   Reason for Visit: Cali Jones is an 63 y.o. male here for a Medicare Wellness visit.     Past Medical, Surgical, and Family History reviewed and updated in chart.    Reviewed all medications by prescribing practitioner or clinical pharmacist (such as prescriptions, OTCs, herbal therapies and supplements) and documented in the medical record.    HPI  63-year-old male presents for welcome to Medicare visit and checkup on diabetes, paroxysmal atrial fibrillation, vitamin D deficiency, vitamin B12 deficiency.  Patient Care Team:  Flo CARRINGTON DO as PCP - General     Review of Systems  Constitutional: no chills, no fever and no night sweats.   Eyes: no blurred vision and no eyesight problems.   ENT: no hearing loss, no nasal congestion, no nasal discharge, no hoarseness and no sore throat.   Cardiovascular: no chest pain, no intermittent leg claudication, no lower extremity edema, no palpitations and no syncope.   Respiratory: no cough, no shortness of breath during exertion, no shortness of breath at rest and no wheezing.   Gastrointestinal: no abdominal pain, no blood in stools, no constipation, no diarrhea, no melena, no nausea, no rectal pain and no vomiting.   Genitourinary: no dysuria, no change in urinary frequency, no urinary hesitancy and no feelings of urinary urgency.   Neurological: no difficulty walking, no headache, no limb weakness, no numbness and no tingling.   Psychiatric: no anxiety, no depression, no anhedonia and no substance use disorders.   Endocrine: no recent weight gain and no recent weight loss.   Hematologic/Lymphatic: no tendency for easy bruising and no swollen glands.  Objective   Vitals:  /65   Wt 78 kg (172 lb)   BMI 24.68 kg/m²       Physical Exam  Constitutional: Alert and in no acute distress. Well developed, well nourished.   Eyes: Pupils were equal in size, round, reactive to light (PERRL) with normal accommodation and extraocular movements intact  (EOMI). Ophthalmoscopic examination: Normal.   Ears, Nose, Mouth, and Throat: External inspection of ears and nose: Normal. Otoscopic examination: Normal. Lips, teeth, and gums: Normal. Oropharynx: Normal.   Neck: No neck mass was observed. Supple. Thyroid not enlarged and there were no palpable thyroid nodules.   Cardiovascular: Heart rate and rhythm were normal, normal S1 and S2, no gallops, no murmurs and no pericardial rub. Carotid pulses: Normal with no bruits. Abdominal aorta: Normal. Pedal pulses: Normal. No peripheral edema.   Pulmonary: No respiratory distress. Clear bilateral breath sounds.   Abdomen: Soft nontender; no abdominal mass palpated. Normal bowel sounds. No organomegaly.   Skin: Normal skin color and pigmentation, normal skin turgor, and no rash.   Psychiatric: Judgment and insight: Intact. Mood and affect: Normal.  Assessment/Plan   Problem List Items Addressed This Visit       Paroxysmal atrial fibrillation (CMS/HCC)    Hyperlipidemia    Relevant Orders    Lipid Panel    Comprehensive Metabolic Panel    Hyperthyroidism    Vitamin D deficiency    Relevant Orders    Vitamin D 25-Hydroxy,Total (for eval of Vitamin D levels)    Vitamin B 12 deficiency    Relevant Orders    CBC and Auto Differential    Vitamin B12    Routine general medical examination at health care facility - Primary    Relevant Orders    1 Year Follow Up In Primary Care - Wellness Exam    Screening for prostate cancer    Relevant Orders    Prostate Specific Antigen, Screen    Need for vaccination    Relevant Orders    Pneumococcal conjugate vaccine 20-valent IM    Screening for cardiovascular condition    Relevant Orders    ECG 12 lead    Age related osteoporosis    Relevant Orders    XR DEXA bone density    Left sided colitis without complications (CMS/HCC)    Secondary hyperparathyroidism, not elsewhere classified (CMS/HCC)     Other Visit Diagnoses       Type 2 diabetes mellitus without complication, without long-term current  use of insulin (CMS/[facility])        Relevant Orders    Hemoglobin A1c    Albumin, urine, random          #1.  Stable physical exam.  2.  We will call fasting blood work results.  3.  I spent 15 minutes obtaining and discussing depression screening using PHQ-2 questions with results documented in chart.  4.  I spent 15 minutes face-to-face with this individual discussing the cardiovascular risk and behavioral therapies of nutritional choices, exercise, and elimination of habits contributing to risk. We agreed on a plan how they may be able to reduce their current cardiovascular risk. Aspirin use was discussed and encouraged. Patient's 10 year cardiovascular risk estimate calculates at 20.2%

## 2024-03-08 NOTE — ACP (ADVANCE CARE PLANNING)
Confirming Previous Code Status:   Advance Care Planning Note     Discussion Date: 03/08/24   Discussion Participants: patient    The patient wishes to discuss Advance Care Planning today and the following is a brief summary of our discussion.     Patient has capacity to make their own medical decisions: Yes  Health Care Agent/Surrogate Decision Maker documented in chart: Yes    Documents on file and valid:  Advance Directive/Living Will: Yes   Health Care Power of : Yes  Other:       Communication of Medical Status/Prognosis:        Communication of Treatment Goals/Options:        Treatment Decisions  Goals of Care: quality of life is prioritized; willing to accept low-burden treatments to achieve meaningful goals     Follow Up Plan    Team Members    Time Statement: Total face to face time spent on advance care planning was 16 minutes with 16 minutes spent in counseling, including the explanation.    Flo Hansen DO  3/8/2024 10:02 AM

## 2024-03-11 ENCOUNTER — PRE-ADMISSION TESTING (OUTPATIENT)
Dept: PREADMISSION TESTING | Facility: HOSPITAL | Age: 64
End: 2024-03-11
Payer: MEDICARE

## 2024-03-11 VITALS
HEIGHT: 71 IN | TEMPERATURE: 95 F | OXYGEN SATURATION: 98 % | HEART RATE: 85 BPM | BODY MASS INDEX: 23.83 KG/M2 | WEIGHT: 170.19 LBS | DIASTOLIC BLOOD PRESSURE: 51 MMHG | RESPIRATION RATE: 18 BRPM | SYSTOLIC BLOOD PRESSURE: 137 MMHG

## 2024-03-11 PROCEDURE — 99203 OFFICE O/P NEW LOW 30 MIN: CPT | Performed by: PHYSICIAN ASSISTANT

## 2024-03-11 ASSESSMENT — ENCOUNTER SYMPTOMS
NEUROLOGICAL NEGATIVE: 1
ENDOCRINE NEGATIVE: 1
BACK PAIN: 1
EYES NEGATIVE: 1
ARTHRALGIAS: 1
ALLERGIC/IMMUNOLOGIC NEGATIVE: 1
GASTROINTESTINAL NEGATIVE: 1
HEMATOLOGIC/LYMPHATIC NEGATIVE: 1
CONSTITUTIONAL NEGATIVE: 1
CARDIOVASCULAR NEGATIVE: 1
RESPIRATORY NEGATIVE: 1
PSYCHIATRIC NEGATIVE: 1

## 2024-03-11 NOTE — CPM/PAT H&P
"[facility]/PAT Evaluation       Name: Cali Jones (Cali Jones)  /Age: 1960/63 y.o.     In-Person       Chief Complaint: Internal derangement of left knee     HPI    Date of Consult: 3/11/24    Referring Provider: Dr. Dotson    Surgery, Date, and Length: Left Knee Arthroscopy; Meniscectomy; Synovectomy; Bursectomy ; 3/12/24; 90 minutes     Cali Jones is a 63 year-old male who presents to the [facility] for perioperative risk assessment prior to surgery.  Was jumping over a creek and developed pain over the posterior lateral left knee.  Symptomatic 11 months.  Continuing pain daily dependent on activity level reaching 7/10 level.  Difficulty sleeping due to the knee.  Relief found with the ice bags.  Denies noted swelling, popping, clicking.  Interferes with activity.    This note was created in part upon personal review of patient's medical records.      Patient is scheduled to have  Left Knee Arthroscopy; Meniscectomy; Synovectomy; Bursectomy     Medical History  Past Medical History:   Diagnosis Date    A-fib (CMS/HCC)     low dose asa, no issues in 13 years    Aortic valve regurgitation     moderate, last echo 23    Arthritis     chronic pain past 20 years - prednisone prn    BPH (benign prostatic hyperplasia)     Fibromyalgia     chronic pain\"all over\" takes prednisone as needed    GERD (gastroesophageal reflux disease)     under control    Migraines     resolved per patient    Noninfective gastroenteritis and colitis, unspecified     Colitis    DICK (obstructive sleep apnea)     had boderline sleep study, no CPAP    Thyroiditis     yearly ultrasound - no present medication    Ulcerative colitis (CMS/HCC)     managed for last 50 years on Delzical        STOP BANG = +DICK    Caprini =   2  (age, male)    Surgical History  Past Surgical History:   Procedure Laterality Date    ABLATION OF DYSRHYTHMIC FOCUS      HEMORRHOID SURGERY      ULNAR NERVE TRANSPOSITION Right              Family " history:  Family History   Problem Relation Name Age of Onset    Transient ischemic attack Mother      Heart attack Father      Breast cancer Sister      Thyroid disease Sister      No Known Problems Brother          Social history:  Social History     Socioeconomic History    Marital status: Single     Spouse name: Not on file    Number of children: Not on file    Years of education: Not on file    Highest education level: Not on file   Occupational History    Not on file   Tobacco Use    Smoking status: Never     Passive exposure: Never    Smokeless tobacco: Never   Vaping Use    Vaping Use: Never used   Substance and Sexual Activity    Alcohol use: Not Currently    Drug use: Never    Sexual activity: Defer   Other Topics Concern    Not on file   Social History Narrative    Not on file     Social Determinants of Health     Financial Resource Strain: Not on file   Food Insecurity: Not on file   Transportation Needs: Not on file   Physical Activity: Not on file   Stress: Not on file   Social Connections: Not on file   Intimate Partner Violence: Not on file   Housing Stability: Not on file          Current Outpatient Medications:     ascorbic acid (Vitamin C) 500 mg tablet, Take 1 tablet (500 mg) by mouth once daily., Disp: , Rfl:     aspirin 81 mg EC tablet, Take 1 tablet (81 mg) by mouth once daily., Disp: , Rfl:     cholecalciferol (Vitamin D3) 25 MCG (1000 UT) capsule, Take 1 capsule (25 mcg) by mouth once daily., Disp: , Rfl:     DelzicoL 400 mg DR capsule, , Disp: , Rfl:     docosahexaenoic acid/epa (FISH OIL ORAL), Take 1 tablet by mouth once daily., Disp: , Rfl:     milk thistle 175 mg tablet, Take 1 tablet (175 mg) by mouth once daily., Disp: , Rfl:     multivitamin tablet, Take 1 tablet by mouth once daily., Disp: , Rfl:     predniSONE (Deltasone) 5 mg tablet, Take 1 tablet (5 mg) by mouth if needed., Disp: , Rfl:     zinc gluconate 50 mg tablet, Take by mouth once daily., Disp: , Rfl:     EPINEPHrine 0.3  "mg/0.3 mL injection syringe, 0.3 mL (0.3 mg). As Directed, Disp: , Rfl:        Visit Vitals  /51   Pulse 85   Temp 35 °C (95 °F)   Resp 18   Ht 1.791 m (5' 10.5\")   Wt 77.2 kg (170 lb 3.1 oz)   SpO2 98%   BMI 24.08 kg/m²   Smoking Status Never   BSA 1.96 m²      Review of Systems   Constitutional: Negative.    HENT: Negative.     Eyes: Negative.    Respiratory: Negative.     Cardiovascular: Negative.         METS >4 hiking regularly   Gastrointestinal: Negative.    Endocrine: Negative.    Genitourinary: Negative.  Negative for decreased urine volume.   Musculoskeletal:  Positive for arthralgias and back pain.        Left knee pain  Right arm ulnar nerve symptoms   Skin: Negative.    Allergic/Immunologic: Negative.    Neurological: Negative.    Hematological: Negative.    Psychiatric/Behavioral: Negative.          Physical Exam  Constitutional:       Appearance: Normal appearance.   HENT:      Head: Normocephalic and atraumatic.      Right Ear: External ear normal.      Left Ear: External ear normal.      Nose: Nose normal.      Mouth/Throat:      Pharynx: Oropharynx is clear.   Eyes:      Conjunctiva/sclera: Conjunctivae normal.   Cardiovascular:      Rate and Rhythm: Normal rate and regular rhythm.      Heart sounds: Normal heart sounds.   Pulmonary:      Effort: Pulmonary effort is normal.      Breath sounds: Normal breath sounds.   Abdominal:      General: Abdomen is flat.      Palpations: Abdomen is soft.   Musculoskeletal:         General: Normal range of motion.      Cervical back: Normal range of motion and neck supple.   Skin:     General: Skin is warm and dry.   Neurological:      General: No focal deficit present.      Mental Status: He is alert and oriented to person, place, and time.   Psychiatric:         Mood and Affect: Mood normal.         Behavior: Behavior normal.         Thought Content: Thought content normal.         Judgment: Judgment normal.          PAT AIRWAY:   Airway:     " Mallampati::  I    Neck ROM::  Full   `    Lab Results   Component Value Date    WBC 4.4 03/08/2024    HGB 14.9 03/08/2024    HCT 44.4 03/08/2024    MCV 95 03/08/2024     03/08/2024      Lab Results   Component Value Date    GLUCOSE 87 03/08/2024    CALCIUM 9.3 03/08/2024     03/08/2024    K 4.3 03/08/2024    CO2 28 03/08/2024     (H) 03/08/2024    BUN 22 03/08/2024    CREATININE 0.99 03/08/2024      Lab Results   Component Value Date    HGBA1C 5.2 03/08/2024      Encounter Date: 03/08/24   ECG 12 lead    Narrative    EKG shows sinus bradycardia with sinus arrhythmia.  No acute ST-T changes.      ECHO 8/16/23  CONCLUSIONS:  1. Left ventricular systolic function is normal with a 60% estimated ejection fraction.  2. Moderate aortic valve regurgitation.    RCRI  0  , 3.9 % Risk of MACE    Cardiac  Hx A fib - continue ASA DOS   Hematology       Patient instructed to ambulate as soon as possible postoperatively to decrease thromboembolic risk.      Initiate mechanical DVT prophylaxis as soon as possible and initiate chemical prophylaxis when deemed safe from a bleeding standpoint post surgery.       VTE prophylaxis per surgical team   GI  UC -  continue mesalamine     Tests ordered in PAT:   LABS REVIEWED from 3/8/24 : unremarkable     Risk assessment complete.  Patient is scheduled for a low surgical risk procedure.        Preoperative medication instructions were provided and reviewed with the patient.  Any additional testing or evaluation was explained to the patient.  Nothing by mouth instructions were discussed and patient's questions were answered prior to conclusion to this encounter.  Patient verbalized understanding of preoperative instructions given in preadmission testing; discharge instructions available in EMR.    This note was dictated by a speech recognition.  Minor errors may have been detected in a speech recognition.

## 2024-03-11 NOTE — H&P (VIEW-ONLY)
"[facility]/PAT Evaluation       Name: Cali Jones (Cali Jones)  /Age: 1960/63 y.o.     In-Person       Chief Complaint: Internal derangement of left knee     HPI    Date of Consult: 3/11/24    Referring Provider: Dr. Dotson    Surgery, Date, and Length: Left Knee Arthroscopy; Meniscectomy; Synovectomy; Bursectomy ; 3/12/24; 90 minutes     Cali Jones is a 63 year-old male who presents to the [facility] for perioperative risk assessment prior to surgery.  Was jumping over a creek and developed pain over the posterior lateral left knee.  Symptomatic 11 months.  Continuing pain daily dependent on activity level reaching 7/10 level.  Difficulty sleeping due to the knee.  Relief found with the ice bags.  Denies noted swelling, popping, clicking.  Interferes with activity.    This note was created in part upon personal review of patient's medical records.      Patient is scheduled to have  Left Knee Arthroscopy; Meniscectomy; Synovectomy; Bursectomy     Medical History  Past Medical History:   Diagnosis Date    A-fib (CMS/HCC)     low dose asa, no issues in 13 years    Aortic valve regurgitation     moderate, last echo 23    Arthritis     chronic pain past 20 years - prednisone prn    BPH (benign prostatic hyperplasia)     Fibromyalgia     chronic pain\"all over\" takes prednisone as needed    GERD (gastroesophageal reflux disease)     under control    Migraines     resolved per patient    Noninfective gastroenteritis and colitis, unspecified     Colitis    DICK (obstructive sleep apnea)     had boderline sleep study, no CPAP    Thyroiditis     yearly ultrasound - no present medication    Ulcerative colitis (CMS/HCC)     managed for last 50 years on Delzical        STOP BANG = +DICK    Caprini =   2  (age, male)    Surgical History  Past Surgical History:   Procedure Laterality Date    ABLATION OF DYSRHYTHMIC FOCUS      HEMORRHOID SURGERY      ULNAR NERVE TRANSPOSITION Right              Family " history:  Family History   Problem Relation Name Age of Onset    Transient ischemic attack Mother      Heart attack Father      Breast cancer Sister      Thyroid disease Sister      No Known Problems Brother          Social history:  Social History     Socioeconomic History    Marital status: Single     Spouse name: Not on file    Number of children: Not on file    Years of education: Not on file    Highest education level: Not on file   Occupational History    Not on file   Tobacco Use    Smoking status: Never     Passive exposure: Never    Smokeless tobacco: Never   Vaping Use    Vaping Use: Never used   Substance and Sexual Activity    Alcohol use: Not Currently    Drug use: Never    Sexual activity: Defer   Other Topics Concern    Not on file   Social History Narrative    Not on file     Social Determinants of Health     Financial Resource Strain: Not on file   Food Insecurity: Not on file   Transportation Needs: Not on file   Physical Activity: Not on file   Stress: Not on file   Social Connections: Not on file   Intimate Partner Violence: Not on file   Housing Stability: Not on file          Current Outpatient Medications:     ascorbic acid (Vitamin C) 500 mg tablet, Take 1 tablet (500 mg) by mouth once daily., Disp: , Rfl:     aspirin 81 mg EC tablet, Take 1 tablet (81 mg) by mouth once daily., Disp: , Rfl:     cholecalciferol (Vitamin D3) 25 MCG (1000 UT) capsule, Take 1 capsule (25 mcg) by mouth once daily., Disp: , Rfl:     DelzicoL 400 mg DR capsule, , Disp: , Rfl:     docosahexaenoic acid/epa (FISH OIL ORAL), Take 1 tablet by mouth once daily., Disp: , Rfl:     milk thistle 175 mg tablet, Take 1 tablet (175 mg) by mouth once daily., Disp: , Rfl:     multivitamin tablet, Take 1 tablet by mouth once daily., Disp: , Rfl:     predniSONE (Deltasone) 5 mg tablet, Take 1 tablet (5 mg) by mouth if needed., Disp: , Rfl:     zinc gluconate 50 mg tablet, Take by mouth once daily., Disp: , Rfl:     EPINEPHrine 0.3  "mg/0.3 mL injection syringe, 0.3 mL (0.3 mg). As Directed, Disp: , Rfl:        Visit Vitals  /51   Pulse 85   Temp 35 °C (95 °F)   Resp 18   Ht 1.791 m (5' 10.5\")   Wt 77.2 kg (170 lb 3.1 oz)   SpO2 98%   BMI 24.08 kg/m²   Smoking Status Never   BSA 1.96 m²      Review of Systems   Constitutional: Negative.    HENT: Negative.     Eyes: Negative.    Respiratory: Negative.     Cardiovascular: Negative.         METS >4 hiking regularly   Gastrointestinal: Negative.    Endocrine: Negative.    Genitourinary: Negative.  Negative for decreased urine volume.   Musculoskeletal:  Positive for arthralgias and back pain.        Left knee pain  Right arm ulnar nerve symptoms   Skin: Negative.    Allergic/Immunologic: Negative.    Neurological: Negative.    Hematological: Negative.    Psychiatric/Behavioral: Negative.          Physical Exam  Constitutional:       Appearance: Normal appearance.   HENT:      Head: Normocephalic and atraumatic.      Right Ear: External ear normal.      Left Ear: External ear normal.      Nose: Nose normal.      Mouth/Throat:      Pharynx: Oropharynx is clear.   Eyes:      Conjunctiva/sclera: Conjunctivae normal.   Cardiovascular:      Rate and Rhythm: Normal rate and regular rhythm.      Heart sounds: Normal heart sounds.   Pulmonary:      Effort: Pulmonary effort is normal.      Breath sounds: Normal breath sounds.   Abdominal:      General: Abdomen is flat.      Palpations: Abdomen is soft.   Musculoskeletal:         General: Normal range of motion.      Cervical back: Normal range of motion and neck supple.   Skin:     General: Skin is warm and dry.   Neurological:      General: No focal deficit present.      Mental Status: He is alert and oriented to person, place, and time.   Psychiatric:         Mood and Affect: Mood normal.         Behavior: Behavior normal.         Thought Content: Thought content normal.         Judgment: Judgment normal.          PAT AIRWAY:   Airway:     " Mallampati::  I    Neck ROM::  Full   `    Lab Results   Component Value Date    WBC 4.4 03/08/2024    HGB 14.9 03/08/2024    HCT 44.4 03/08/2024    MCV 95 03/08/2024     03/08/2024      Lab Results   Component Value Date    GLUCOSE 87 03/08/2024    CALCIUM 9.3 03/08/2024     03/08/2024    K 4.3 03/08/2024    CO2 28 03/08/2024     (H) 03/08/2024    BUN 22 03/08/2024    CREATININE 0.99 03/08/2024      Lab Results   Component Value Date    HGBA1C 5.2 03/08/2024      Encounter Date: 03/08/24   ECG 12 lead    Narrative    EKG shows sinus bradycardia with sinus arrhythmia.  No acute ST-T changes.      ECHO 8/16/23  CONCLUSIONS:  1. Left ventricular systolic function is normal with a 60% estimated ejection fraction.  2. Moderate aortic valve regurgitation.    RCRI  0  , 3.9 % Risk of MACE    Cardiac  Hx A fib - continue ASA DOS   Hematology       Patient instructed to ambulate as soon as possible postoperatively to decrease thromboembolic risk.      Initiate mechanical DVT prophylaxis as soon as possible and initiate chemical prophylaxis when deemed safe from a bleeding standpoint post surgery.       VTE prophylaxis per surgical team   GI  UC -  continue mesalamine     Tests ordered in PAT:   LABS REVIEWED from 3/8/24 : unremarkable     Risk assessment complete.  Patient is scheduled for a low surgical risk procedure.        Preoperative medication instructions were provided and reviewed with the patient.  Any additional testing or evaluation was explained to the patient.  Nothing by mouth instructions were discussed and patient's questions were answered prior to conclusion to this encounter.  Patient verbalized understanding of preoperative instructions given in preadmission testing; discharge instructions available in EMR.    This note was dictated by a speech recognition.  Minor errors may have been detected in a speech recognition.

## 2024-03-11 NOTE — PREPROCEDURE INSTRUCTIONS
Medication List            Accurate as of March 11, 2024  8:03 AM. Always use your most recent med list.                aspirin 81 mg EC tablet  Medication Adjustments for Surgery: Take morning of surgery with sip of water, no other fluids     DelzicoL 400 mg DR capsule  Generic drug: mesalamine  Medication Adjustments for Surgery: Take morning of surgery with sip of water, no other fluids     EPINEPHrine 0.3 mg/0.3 mL injection syringe  Commonly known as: Epipen     FISH OIL ORAL  Medication Adjustments for Surgery: Stop 7 days before surgery     milk thistle 175 mg tablet  Medication Adjustments for Surgery: Stop 7 days before surgery     multivitamin tablet  Medication Adjustments for Surgery: Stop 7 days before surgery     predniSONE 5 mg tablet  Commonly known as: Deltasone  Medication Adjustments for Surgery: Take morning of surgery with sip of water, no other fluids     Vitamin C 500 mg tablet  Generic drug: ascorbic acid  Medication Adjustments for Surgery: Continue until night before surgery     Vitamin D3 25 MCG (1000 UT) capsule  Generic drug: cholecalciferol  Medication Adjustments for Surgery: Continue until night before surgery     zinc gluconate 50 mg tablet  Medication Adjustments for Surgery: Continue until night before surgery                 CONTACT SURGEON'S OFFICE IF YOU DEVELOP:  * Fever = 100.4 F   * New respiratory symptoms (e.g. cough, shortness of breath, respiratory distress, sore throat)  * Recent loss of taste or smell  *Flu like symptoms such as headache, fatigue or gastrointestinal symptoms  * You develop any open sores, shingles, burning or painful urination   AND/OR:  * You no longer wish to have the surgery.  * Any other personal circumstances change that may lead to the need to cancel or defer this surgery.  *You were admitted to any hospital within one week of your planned procedure.    SMOKING:  *Quitting smoking can make a huge difference to your health and recovery from  surgery.    *If you need help with quitting, call 4-689-QUIT-NOW.    THE DAY BEFORE SURGERY:  *Do not eat any food after midnight the night before surgery/procedure.   *You are permitted to drink clear liquids (i.e. water, black coffee/tea, (no milk or cream) apple juice, and electrolyte drinks (Gatorade)10 ounces up to 2 hours before your instructed arrival time to the hospital.  *You may chew gum until  2 hours before your surgery/procedure.    SURGICAL TIME  *You will be contacted between 2 p.m. and 6 p.m. the business day before your surgery with your arrival time.  *If you haven't received a call by 6pm, call [phone].  *Scheduled surgery times may change and you will be notified if this occurs-check your personal voicemail for any updates.    ON THE MORNING OF SURGERY:  *Wear comfortable, loose fitting clothing.   *Do not use moisturizers, creams, lotions or perfume.  *All jewelry and valuables should be left at home.  *Prosthetic devices such as contact lenses, hearing aids, dentures, eyelash extensions, hairpins and body piercing must be removed before surgery.    BRING WITH YOU:  *Photo ID and insurance card  *Current list of medicines and allergies  *Pacemaker/Defibrillator/Heart stent cards  *CPAP machine and mask  *Slings/splints/crutches  *Copy of your complete Advanced Directive/DHPOA-if applicable  *Neurostimulator implant remote    PARKING AND ARRIVAL:  *Check in at the Main Entrance desk and let them know you are here for surgery.  *You will be directed to the 2nd floor surgical waiting area.    AFTER OUTPATIENT SURGERY:  *A responsible adult MUST accompany you at the time of discharge and stay with you for 24 hours after your surgery.  *You may NOT drive yourself home after surgery.  *You may use a taxi or ride sharing service ([employer], Uber) to return home ONLY if you are accompanied by a friend or family member.  *Instructions for resuming your medications will be provided by your surgeon.

## 2024-03-12 ENCOUNTER — HOSPITAL ENCOUNTER (OUTPATIENT)
Facility: HOSPITAL | Age: 64
Setting detail: OUTPATIENT SURGERY
Discharge: HOME | End: 2024-03-12
Attending: ORTHOPAEDIC SURGERY | Admitting: ORTHOPAEDIC SURGERY
Payer: MEDICARE

## 2024-03-12 ENCOUNTER — ANESTHESIA EVENT (OUTPATIENT)
Dept: OPERATING ROOM | Facility: HOSPITAL | Age: 64
End: 2024-03-12
Payer: MEDICARE

## 2024-03-12 ENCOUNTER — ANESTHESIA (OUTPATIENT)
Dept: OPERATING ROOM | Facility: HOSPITAL | Age: 64
End: 2024-03-12
Payer: MEDICARE

## 2024-03-12 VITALS
WEIGHT: 170.19 LBS | OXYGEN SATURATION: 97 % | SYSTOLIC BLOOD PRESSURE: 143 MMHG | TEMPERATURE: 97.3 F | RESPIRATION RATE: 15 BRPM | HEART RATE: 59 BPM | HEIGHT: 71 IN | BODY MASS INDEX: 23.83 KG/M2 | DIASTOLIC BLOOD PRESSURE: 59 MMHG

## 2024-03-12 DIAGNOSIS — M23.92 INTERNAL DERANGEMENT OF LEFT KNEE: Primary | ICD-10-CM

## 2024-03-12 PROCEDURE — 7100000002 HC RECOVERY ROOM TIME - EACH INCREMENTAL 1 MINUTE: Performed by: ORTHOPAEDIC SURGERY

## 2024-03-12 PROCEDURE — 2500000005 HC RX 250 GENERAL PHARMACY W/O HCPCS: Performed by: ORTHOPAEDIC SURGERY

## 2024-03-12 PROCEDURE — A4217 STERILE WATER/SALINE, 500 ML: HCPCS | Mod: MUE | Performed by: ORTHOPAEDIC SURGERY

## 2024-03-12 PROCEDURE — 7100000010 HC PHASE TWO TIME - EACH INCREMENTAL 1 MINUTE: Performed by: ORTHOPAEDIC SURGERY

## 2024-03-12 PROCEDURE — 3700000001 HC GENERAL ANESTHESIA TIME - INITIAL BASE CHARGE: Performed by: ORTHOPAEDIC SURGERY

## 2024-03-12 PROCEDURE — 3600000004 HC OR TIME - INITIAL BASE CHARGE - PROCEDURE LEVEL FOUR: Performed by: ORTHOPAEDIC SURGERY

## 2024-03-12 PROCEDURE — 29875 ARTHRS KNEE SURG SYNVCT LMTD: CPT | Performed by: ORTHOPAEDIC SURGERY

## 2024-03-12 PROCEDURE — A29875 PR KNEE SCOPE,PART SYNOVECT: Performed by: ANESTHESIOLOGIST ASSISTANT

## 2024-03-12 PROCEDURE — 2720000007 HC OR 272 NO HCPCS: Performed by: ORTHOPAEDIC SURGERY

## 2024-03-12 PROCEDURE — 2500000004 HC RX 250 GENERAL PHARMACY W/ HCPCS (ALT 636 FOR OP/ED): Performed by: ANESTHESIOLOGIST ASSISTANT

## 2024-03-12 PROCEDURE — 3700000002 HC GENERAL ANESTHESIA TIME - EACH INCREMENTAL 1 MINUTE: Performed by: ORTHOPAEDIC SURGERY

## 2024-03-12 PROCEDURE — 2500000004 HC RX 250 GENERAL PHARMACY W/ HCPCS (ALT 636 FOR OP/ED): Performed by: ORTHOPAEDIC SURGERY

## 2024-03-12 PROCEDURE — A29875 PR KNEE SCOPE,PART SYNOVECT: Performed by: ANESTHESIOLOGY

## 2024-03-12 PROCEDURE — 7100000001 HC RECOVERY ROOM TIME - INITIAL BASE CHARGE: Performed by: ORTHOPAEDIC SURGERY

## 2024-03-12 PROCEDURE — 3600000009 HC OR TIME - EACH INCREMENTAL 1 MINUTE - PROCEDURE LEVEL FOUR: Performed by: ORTHOPAEDIC SURGERY

## 2024-03-12 PROCEDURE — 2500000005 HC RX 250 GENERAL PHARMACY W/O HCPCS: Performed by: ANESTHESIOLOGIST ASSISTANT

## 2024-03-12 PROCEDURE — 7100000009 HC PHASE TWO TIME - INITIAL BASE CHARGE: Performed by: ORTHOPAEDIC SURGERY

## 2024-03-12 RX ORDER — ONDANSETRON HYDROCHLORIDE 2 MG/ML
4 INJECTION, SOLUTION INTRAVENOUS ONCE AS NEEDED
Status: DISCONTINUED | OUTPATIENT
Start: 2024-03-12 | End: 2024-03-12 | Stop reason: HOSPADM

## 2024-03-12 RX ORDER — SODIUM CHLORIDE, SODIUM LACTATE, POTASSIUM CHLORIDE, CALCIUM CHLORIDE 600; 310; 30; 20 MG/100ML; MG/100ML; MG/100ML; MG/100ML
100 INJECTION, SOLUTION INTRAVENOUS CONTINUOUS
Status: DISCONTINUED | OUTPATIENT
Start: 2024-03-12 | End: 2024-03-12 | Stop reason: HOSPADM

## 2024-03-12 RX ORDER — HYDRALAZINE HYDROCHLORIDE 20 MG/ML
5 INJECTION INTRAMUSCULAR; INTRAVENOUS EVERY 30 MIN PRN
Status: DISCONTINUED | OUTPATIENT
Start: 2024-03-12 | End: 2024-03-12 | Stop reason: HOSPADM

## 2024-03-12 RX ORDER — OXYCODONE HYDROCHLORIDE 5 MG/1
5 TABLET ORAL EVERY 4 HOURS PRN
Status: DISCONTINUED | OUTPATIENT
Start: 2024-03-12 | End: 2024-03-12 | Stop reason: HOSPADM

## 2024-03-12 RX ORDER — CEFAZOLIN SODIUM 2 G/100ML
2 INJECTION, SOLUTION INTRAVENOUS ONCE
Status: DISCONTINUED | OUTPATIENT
Start: 2024-03-12 | End: 2024-03-12 | Stop reason: HOSPADM

## 2024-03-12 RX ORDER — FENTANYL CITRATE 50 UG/ML
INJECTION, SOLUTION INTRAMUSCULAR; INTRAVENOUS AS NEEDED
Status: DISCONTINUED | OUTPATIENT
Start: 2024-03-12 | End: 2024-03-12

## 2024-03-12 RX ORDER — LIDOCAINE HYDROCHLORIDE 20 MG/ML
INJECTION, SOLUTION EPIDURAL; INFILTRATION; INTRACAUDAL; PERINEURAL AS NEEDED
Status: DISCONTINUED | OUTPATIENT
Start: 2024-03-12 | End: 2024-03-12

## 2024-03-12 RX ORDER — SODIUM CHLORIDE 0.9 G/100ML
IRRIGANT IRRIGATION AS NEEDED
Status: DISCONTINUED | OUTPATIENT
Start: 2024-03-12 | End: 2024-03-12 | Stop reason: HOSPADM

## 2024-03-12 RX ORDER — DEXAMETHASONE SODIUM PHOSPHATE 4 MG/ML
INJECTION, SOLUTION INTRA-ARTICULAR; INTRALESIONAL; INTRAMUSCULAR; INTRAVENOUS; SOFT TISSUE AS NEEDED
Status: DISCONTINUED | OUTPATIENT
Start: 2024-03-12 | End: 2024-03-12

## 2024-03-12 RX ORDER — NAPROXEN SODIUM 220 MG/1
81 TABLET, FILM COATED ORAL 2 TIMES DAILY
Qty: 28 TABLET | Refills: 0 | Status: SHIPPED | OUTPATIENT
Start: 2024-03-12 | End: 2024-03-26

## 2024-03-12 RX ORDER — HYDROCODONE BITARTRATE AND ACETAMINOPHEN 5; 325 MG/1; MG/1
1 TABLET ORAL EVERY 6 HOURS PRN
Qty: 28 TABLET | Refills: 0 | Status: SHIPPED | OUTPATIENT
Start: 2024-03-12 | End: 2024-03-26

## 2024-03-12 RX ORDER — CEFAZOLIN 1 G/1
INJECTION, POWDER, FOR SOLUTION INTRAVENOUS AS NEEDED
Status: DISCONTINUED | OUTPATIENT
Start: 2024-03-12 | End: 2024-03-12

## 2024-03-12 RX ORDER — LIDOCAINE HYDROCHLORIDE AND EPINEPHRINE 10; 10 MG/ML; UG/ML
INJECTION, SOLUTION INFILTRATION; PERINEURAL AS NEEDED
Status: DISCONTINUED | OUTPATIENT
Start: 2024-03-12 | End: 2024-03-12 | Stop reason: HOSPADM

## 2024-03-12 RX ORDER — MIDAZOLAM HYDROCHLORIDE 1 MG/ML
INJECTION INTRAMUSCULAR; INTRAVENOUS AS NEEDED
Status: DISCONTINUED | OUTPATIENT
Start: 2024-03-12 | End: 2024-03-12

## 2024-03-12 RX ORDER — PROPOFOL 10 MG/ML
INJECTION, EMULSION INTRAVENOUS AS NEEDED
Status: DISCONTINUED | OUTPATIENT
Start: 2024-03-12 | End: 2024-03-12

## 2024-03-12 RX ORDER — ONDANSETRON HYDROCHLORIDE 2 MG/ML
INJECTION, SOLUTION INTRAVENOUS AS NEEDED
Status: DISCONTINUED | OUTPATIENT
Start: 2024-03-12 | End: 2024-03-12

## 2024-03-12 RX ORDER — KETOROLAC TROMETHAMINE 30 MG/ML
INJECTION, SOLUTION INTRAMUSCULAR; INTRAVENOUS AS NEEDED
Status: DISCONTINUED | OUTPATIENT
Start: 2024-03-12 | End: 2024-03-12

## 2024-03-12 RX ADMIN — FENTANYL CITRATE 50 MCG: 50 INJECTION, SOLUTION INTRAMUSCULAR; INTRAVENOUS at 11:18

## 2024-03-12 RX ADMIN — LIDOCAINE HYDROCHLORIDE 50 MG: 20 INJECTION, SOLUTION EPIDURAL; INFILTRATION; INTRACAUDAL; PERINEURAL at 11:07

## 2024-03-12 RX ADMIN — DEXAMETHASONE SODIUM PHOSPHATE 4 MG: 4 INJECTION, SOLUTION INTRAMUSCULAR; INTRAVENOUS at 11:10

## 2024-03-12 RX ADMIN — CEFAZOLIN 2 G: 1 INJECTION, POWDER, FOR SOLUTION INTRAMUSCULAR; INTRAVENOUS at 11:10

## 2024-03-12 RX ADMIN — MIDAZOLAM HYDROCHLORIDE 2 MG: 1 INJECTION, SOLUTION INTRAMUSCULAR; INTRAVENOUS at 11:02

## 2024-03-12 RX ADMIN — FENTANYL CITRATE 50 MCG: 50 INJECTION, SOLUTION INTRAMUSCULAR; INTRAVENOUS at 11:30

## 2024-03-12 RX ADMIN — KETOROLAC TROMETHAMINE 30 MG: 30 INJECTION INTRAMUSCULAR; INTRAVENOUS at 11:35

## 2024-03-12 RX ADMIN — PROPOFOL 200 MG: 10 INJECTION, EMULSION INTRAVENOUS at 11:07

## 2024-03-12 RX ADMIN — GLYCOPYRROLATE 0.2 MG: 0.2 INJECTION, SOLUTION INTRAMUSCULAR; INTRAVITREAL at 11:18

## 2024-03-12 RX ADMIN — SODIUM CHLORIDE, POTASSIUM CHLORIDE, SODIUM LACTATE AND CALCIUM CHLORIDE 100 ML/HR: 600; 310; 30; 20 INJECTION, SOLUTION INTRAVENOUS at 08:50

## 2024-03-12 RX ADMIN — ONDANSETRON 4 MG: 2 INJECTION INTRAMUSCULAR; INTRAVENOUS at 11:34

## 2024-03-12 ASSESSMENT — COLUMBIA-SUICIDE SEVERITY RATING SCALE - C-SSRS
6. HAVE YOU EVER DONE ANYTHING, STARTED TO DO ANYTHING, OR PREPARED TO DO ANYTHING TO END YOUR LIFE?: NO
2. HAVE YOU ACTUALLY HAD ANY THOUGHTS OF KILLING YOURSELF?: NO
1. IN THE PAST MONTH, HAVE YOU WISHED YOU WERE DEAD OR WISHED YOU COULD GO TO SLEEP AND NOT WAKE UP?: NO

## 2024-03-12 ASSESSMENT — PAIN - FUNCTIONAL ASSESSMENT
PAIN_FUNCTIONAL_ASSESSMENT: 0-10

## 2024-03-12 ASSESSMENT — PAIN SCALES - GENERAL
PAIN_LEVEL: 0
PAINLEVEL_OUTOF10: 5 - MODERATE PAIN
PAINLEVEL_OUTOF10: 2
PAINLEVEL_OUTOF10: 0 - NO PAIN
PAINLEVEL_OUTOF10: 2
PAINLEVEL_OUTOF10: 2

## 2024-03-12 NOTE — ANESTHESIA PROCEDURE NOTES
Airway  Date/Time: 3/12/2024 11:09 AM  Urgency: elective    Airway not difficult    Staffing  Performed: PATEL   Authorized by: Priya Prieto MD    Performed by: PATEL Carr  Patient location during procedure: OR    Indications and Patient Condition  Indications for airway management: anesthesia  Spontaneous Ventilation: absent  Sedation level: deep  Preoxygenated: yes  Patient position: sniffing  MILS not maintained throughout  Mask difficulty assessment: 1 - vent by mask  Planned trial extubation    Final Airway Details  Final airway type: supraglottic airway      Successful airway: Supraglottic airway: IGel.  Size 4     Number of attempts at approach: 1

## 2024-03-12 NOTE — OP NOTE
Left Knee Arthroscopy; Meniscectomy; Synovectomy; Bursectomy (L) Operative Note     Date: 3/12/2024  OR Location: [facility] OR    Name: Cali Jones, : 1960, Age: 63 y.o., MRN: [id number], Sex: male    Diagnosis  Pre-op Diagnosis     * Internal derangement of left knee [M23.92] Post-op Diagnosis     * Internal derangement of left knee [M23.92]     Procedures  Left Knee Arthroscopy; Meniscectomy; Synovectomy; Bursectomy  63321 - SC ARTHRS KNE SURG W/MENISCECTOMY MED/LAT W/SHVG    Left Knee Arthroscopy; Meniscectomy; Synovectomy; Bursectomy  67389 - SC ARTHROSCOPY KNEE SYNOVECTOMY LIMITED SPX      Surgeons      * José Luis Dotson - Primary    Resident/Fellow/Other Assistant:  Surgeon(s) and Role:    Procedure Summary  Anesthesia: Consult  ASA: ASA status not filed in the log.  Anesthesia Staff: No anesthesia staff entered.  Estimated Blood Loss: 3 mL  Intra-op Medications: Administrations occurring from 1030 to 1200 on 24:  * No intraprocedure medications in log *        Specimen: No specimens collected     Staff:   No surgical staff documented.         Drains and/or Catheters: * None in log *    Tourniquet Times:   * Missing tourniquet times found for documented tourniquets in lo *     Implants:     Findings: Thickened plica along the lateral and medial gutter.  Intact meniscus.    Indications: Cali Jones is an 63 y.o. male who is having surgery for Internal derangement of left knee [M23.92].  Recurrent effusion with mechanical symptoms.  Here for arthroscopic exam and treatment.    The patient was seen in the preoperative area. The risks, benefits, complications, treatment options, non-operative alternatives, expected recovery and outcomes were discussed with the patient. The possibilities of reaction to medication, pulmonary aspiration, injury to surrounding structures, bleeding, recurrent infection, the need for additional procedures, failure to diagnose a condition, and creating a  complication requiring transfusion or operation were discussed with the patient. The patient concurred with the proposed plan, giving informed consent.  The site of surgery was properly noted/marked if necessary per policy. The patient has been actively warmed in preoperative area. Preoperative antibiotics have been ordered and given within 1 hours of incision. Venous thrombosis prophylaxis are not indicated.    Procedure Details:   Preop DX: Internal derangement left knee  Postop DX: Left knee patellofemoral plica  Procedure: Left knee arthroscopic plica resection  Surgeon: José Luis Dotson MD  Asst: Surgical assistant  Anesthesia: General and local  Clinical Note: 63-year-old male with persistent pain left knee despite conservative therapy and injections.  MRI showed no obvious meniscal tear.  Here for arthroscopic exam and treatment.  Understood the risk of surgically bleeding, infection, stiffness, possible need for further surgery or bracing.  Answered these risk wished to proceed.  Procedure Note: Patient brought to operating room.  Timeout performed.  Antibiotics given IV.  General anesthesia given.  LMA placed.  Left leg was prepped and draped typical sterile fashion tourniquet on the thigh.  Thigh tourniquet was applied but not inflated.  Leg shelley was used.  Knee was injected with 10 cc of 1% lidocaine with epinephrine.  Standard anteromedial anterolateral portals were used.  Arthroscopic exam showed central tracking patella.  Large plica with synovitis along the medial and lateral patellofemoral joint impinging with flexion.  The lateral compartment showed intact lateral meniscus.  Intact articular cartilage.  Intact ACL in the notch.  Medial compartment showed intact medial meniscus and intact articular cartilage.  After the examination was complete the plica resected with a shaver along with partial synovectomy.  Knee was taken through range of motion and showed no impinging lesion.  He was irrigated.   Inject with Marcaine epinephrine and Duramorph.  Incision closed simple suture.  Soft bulky dressing applied.  Complications:  None; patient tolerated the procedure well.    Disposition: PACU - hemodynamically stable.  Condition: stable         Additional Details: None    Attending Attestation:     José Luis Dotson  Phone Number: [phone]

## 2024-03-12 NOTE — ANESTHESIA POSTPROCEDURE EVALUATION
Patient: Cali Jones    Procedure Summary       Date: 03/12/24 Room / Location: [facility] OR 17 / [facility] OR    Anesthesia Start: 1102 Anesthesia Stop: 1147    Procedure: Left Knee Arthroscopy with plica resection (Left: Knee) Diagnosis:       Internal derangement of left knee      (Internal derangement of left knee [M23.92])    Surgeons: José Luis Dotson MD Responsible Provider: Priya Prieto MD    Anesthesia Type: general ASA Status: 3            Anesthesia Type: general    Vitals Value Taken Time   /56 03/12/24 1201   Temp 36.2 °C (97.2 °F) 03/12/24 1144   Pulse 56 03/12/24 1210   Resp 18 03/12/24 1210   SpO2 96 % 03/12/24 1210   Vitals shown include unvalidated device data.    Anesthesia Post Evaluation    Patient location during evaluation: bedside  Patient participation: complete - patient participated  Level of consciousness: awake  Pain score: 0  Pain management: adequate  Multimodal analgesia pain management approach  Airway patency: patent  Cardiovascular status: stable  Respiratory status: spontaneous ventilation and unassisted  Hydration status: acceptable  Postoperative Nausea and Vomiting: none (No significant PONV)        No notable events documented.

## 2024-03-12 NOTE — ANESTHESIA PREPROCEDURE EVALUATION
"    Patient: Cali Jones    Procedure Information       Date/Time: 03/12/24 1030    Procedure: Left Knee Arthroscopy; Meniscectomy; Synovectomy; Bursectomy (Left: Knee)    Location: [facility] A OR  / [facility] OR    Surgeons: José Luis Dotson MD                                                     Pre-Anesthesia Evaluation      Cali Jones is a 63 y.o. male who presents for procedure stated above.     Medical History reviewed  Past Medical History:   Diagnosis Date    A-fib (CMS/HCC)     low dose asa, no issues in 13 years    Aortic valve regurgitation     moderate, last echo 8/16/23    Arthritis     chronic pain past 20 years - prednisone prn    BPH (benign prostatic hyperplasia)     Fibromyalgia     chronic pain\"all over\" takes prednisone as needed    GERD (gastroesophageal reflux disease)     under control    Migraines     resolved per patient    Noninfective gastroenteritis and colitis, unspecified     Colitis    DICK (obstructive sleep apnea)     had boderline sleep study, no CPAP    Thyroiditis     yearly ultrasound - no present medication    Ulcerative colitis (CMS/HCC)     managed for last 50 years on Delzical     Surgical History reviewed   Past Surgical History:   Procedure Laterality Date    ABLATION OF DYSRHYTHMIC FOCUS      HEMORRHOID SURGERY      ULNAR NERVE TRANSPOSITION Right        Social History reviewed  He reports that he has never smoked. He has never been exposed to tobacco smoke. He has never used smokeless tobacco. He reports that he does not currently use alcohol. He reports that he does not use drugs.    Allergies and Medications reviewed  Bee venom protein (honey bee)    Current Outpatient Medications   Medication Instructions    ascorbic acid (VITAMIN C) 500 mg, oral, Daily    aspirin 81 mg EC tablet 1 tablet, oral, Daily    cholecalciferol (VITAMIN D3) 25 mcg, oral, Daily    DelzicoL 400 mg DR capsule     docosahexaenoic acid/epa (FISH OIL ORAL) 1 tablet, oral, Daily    EPINEPHrine " "(EPIPEN) 0.3 mg, As Directed    milk thistle 175 mg, oral, Daily    multivitamin tablet 1 tablet, oral, Daily    predniSONE (DELTASONE) 5 mg, oral, As needed    zinc gluconate 50 mg tablet oral, Daily       Relevant Results reviewed  No results found for: \"STAPHMRSASCR\"  No results found for: \"ABO\"  No results found for: \"ABORH\"      Lab Results   Component Value Date    WBC 4.4 03/08/2024    HGB 14.9 03/08/2024    HCT 44.4 03/08/2024     03/08/2024       Chemistry    Lab Results   Component Value Date/Time     03/08/2024 0941    K 4.3 03/08/2024 0941     (H) 03/08/2024 0941    CO2 28 03/08/2024 0941    BUN 22 03/08/2024 0941    CREATININE 0.99 03/08/2024 0941    Lab Results   Component Value Date/Time    CALCIUM 9.3 03/08/2024 0941    ALKPHOS 71 03/08/2024 0941    AST 19 03/08/2024 0941    ALT 15 03/08/2024 0941    BILITOT 0.4 03/08/2024 0941          No results found for: \"PROTIME\", \"INR\", \"PTT\"  Lab Results   Component Value Date    PTH 39.9 09/16/2021    CALCIUM 9.3 03/08/2024     Lab Results   Component Value Date    TSH 1.19 10/03/2023    THYROIDPAB 45 03/15/2021     Past Cardiology Tests (Last 3 Years):  EKG:  Results for orders placed in visit on 03/08/24    ECG 12 lead    Narrative  EKG shows sinus bradycardia with sinus arrhythmia.  No acute ST-T changes.    Echo:  No results found for this or any previous visit.    No results found for this or any previous visit.  Ejection Fractions:  No results found for: \"EF\"  Cath:  No results found for this or any previous visit.    Stress Test:  No results found for this or any previous visit from the past 1000 days.      Imaging  === 09/20/23 ===    KNEE    - Impression -  Normal left knee radiographs.    No image results found.    Vitals  Visit Vitals  /53   Pulse 53   Temp 36.3 °C (97.3 °F) (Temporal)   Resp 11   Ht 1.791 m (5' 10.5\")   Wt 77.2 kg (170 lb 3.1 oz)   SpO2 98%   BMI 24.08 kg/m²   Smoking Status Never   BSA 1.96 m² "                     Relevant Problems   Cardiovascular   (+) Bradycardia, sinus   (+) Hyperlipidemia   (+) Moderate aortic valve insufficiency   (+) Paroxysmal atrial fibrillation (CMS/HCC)      Endocrine   (+) Hyperthyroidism   (+) Secondary hyperparathyroidism, not elsewhere classified (CMS/HCC)      Pulmonary   (+) DICK (obstructive sleep apnea)      Musculoskeletal   (+) Fibromyalgia      Infectious Disease   (+) Acute viral pharyngitis   (+) COVID-19   (+) Tinea cruris   (+) Viral upper respiratory tract infection      Other   (+) Inflammatory arthritis   (+) Lingual tonsil hypertrophy       Clinical information reviewed:    Allergies                NPO Detail:  NPO/Void Status  Date of Last Liquid: 03/12/24  Time of Last Liquid: 0600  Date of Last Solid: 03/11/24  Time of Last Solid: 1600         Physical Exam    Airway  Mallampati: I  TM distance: >3 FB  Neck ROM: full  Comments: Micrognathia- Not present  Mouth opening adequate  Difficult airway anticipated- No   Cardiovascular   Rhythm: regular  Rate: normal     Dental        Pulmonary   Comments: Non labored respiration   Abdominal            Anesthesia Plan    History of general anesthesia?: yes  History of complications of general anesthesia?: no    ASA 3     general   (LMA)  intravenous induction   Anesthetic plan and risks discussed with patient.    Plan discussed with CRNA and CAA.

## 2024-03-12 NOTE — DISCHARGE INSTRUCTIONS
Additional instructions  Ice/Elevate operative extremity.  Keep dressing clean and dry.    Keep dressing in place until POD#3. May shower at that time. Rewrap with ACE after shower.  Weightbearing: WBAT on operative extremity with crutches/walker.   Norco 1 by mouth every 6 hours as needed for pain.  Start Tylenol 650 mg by mouth every 6 hours as needed for pain.  Aspirin 81 mg by mouth twice daily.  F/U with Dr. Dotson in 2 weeks.  Call [phone] for appointment time.

## 2024-03-15 ENCOUNTER — APPOINTMENT (OUTPATIENT)
Dept: RADIOLOGY | Facility: CLINIC | Age: 64
End: 2024-03-15
Payer: MEDICARE

## 2024-03-20 ENCOUNTER — OFFICE VISIT (OUTPATIENT)
Dept: ORTHOPEDIC SURGERY | Facility: CLINIC | Age: 64
End: 2024-03-20
Payer: MEDICARE

## 2024-03-20 VITALS — HEIGHT: 70 IN | BODY MASS INDEX: 24.34 KG/M2 | WEIGHT: 170 LBS

## 2024-03-20 DIAGNOSIS — M67.52 SYNOVIAL PLICA SYNDROME OF LEFT KNEE: Primary | ICD-10-CM

## 2024-03-20 PROCEDURE — 1036F TOBACCO NON-USER: CPT | Performed by: ORTHOPAEDIC SURGERY

## 2024-03-20 PROCEDURE — 1159F MED LIST DOCD IN RCRD: CPT | Performed by: ORTHOPAEDIC SURGERY

## 2024-03-20 PROCEDURE — 99024 POSTOP FOLLOW-UP VISIT: CPT | Performed by: ORTHOPAEDIC SURGERY

## 2024-03-20 PROCEDURE — 1160F RVW MEDS BY RX/DR IN RCRD: CPT | Performed by: ORTHOPAEDIC SURGERY

## 2024-03-20 PROCEDURE — 1125F AMNT PAIN NOTED PAIN PRSNT: CPT | Performed by: ORTHOPAEDIC SURGERY

## 2024-03-20 ASSESSMENT — PAIN DESCRIPTION - DESCRIPTORS: DESCRIPTORS: THROBBING;SHOOTING

## 2024-03-20 ASSESSMENT — PAIN - FUNCTIONAL ASSESSMENT: PAIN_FUNCTIONAL_ASSESSMENT: 0-10

## 2024-03-20 ASSESSMENT — PAIN SCALES - GENERAL: PAINLEVEL_OUTOF10: 7

## 2024-03-20 NOTE — PROGRESS NOTES
S: Pain well controlled.  Difficulty sleeping.    O: Incisions healing nicely.  Minimal swelling.  No crepitus.    A: S/P left knee arthroscopy with plica resection.    P: Steri-Strips placed.  Continue ice.  Begin physical therapy.  Reviewed arthroscopic findings.  Reevaluate 1 month.

## 2024-03-22 ENCOUNTER — HOSPITAL ENCOUNTER (OUTPATIENT)
Dept: RADIOLOGY | Facility: CLINIC | Age: 64
Discharge: HOME | End: 2024-03-22
Payer: MEDICARE

## 2024-03-22 DIAGNOSIS — M81.0 AGE RELATED OSTEOPOROSIS, UNSPECIFIED PATHOLOGICAL FRACTURE PRESENCE: ICD-10-CM

## 2024-03-22 PROCEDURE — 77080 DXA BONE DENSITY AXIAL: CPT

## 2024-03-28 ENCOUNTER — LAB (OUTPATIENT)
Dept: LAB | Facility: LAB | Age: 64
End: 2024-03-28
Payer: MEDICARE

## 2024-03-28 DIAGNOSIS — E03.9 HYPOTHYROIDISM, UNSPECIFIED: ICD-10-CM

## 2024-03-28 DIAGNOSIS — E55.9 VITAMIN D DEFICIENCY, UNSPECIFIED: ICD-10-CM

## 2024-03-28 DIAGNOSIS — E11.9 TYPE 2 DIABETES MELLITUS WITHOUT COMPLICATIONS (MULTI): Primary | ICD-10-CM

## 2024-03-28 DIAGNOSIS — E78.5 HYPERLIPIDEMIA, UNSPECIFIED: ICD-10-CM

## 2024-03-28 LAB
T4 FREE SERPL-MCNC: 0.99 NG/DL (ref 0.61–1.12)
TSH SERPL-ACNC: 3.08 MIU/L (ref 0.44–3.98)

## 2024-03-28 PROCEDURE — 36415 COLL VENOUS BLD VENIPUNCTURE: CPT

## 2024-03-28 PROCEDURE — 84439 ASSAY OF FREE THYROXINE: CPT

## 2024-03-28 PROCEDURE — 84443 ASSAY THYROID STIM HORMONE: CPT

## 2024-05-01 ENCOUNTER — OFFICE VISIT (OUTPATIENT)
Dept: ORTHOPEDIC SURGERY | Facility: CLINIC | Age: 64
End: 2024-05-01
Payer: MEDICARE

## 2024-05-01 DIAGNOSIS — M23.90 INTERNAL DERANGEMENT OF KNEE, UNSPECIFIED LATERALITY: ICD-10-CM

## 2024-05-01 DIAGNOSIS — M67.52 SYNOVIAL PLICA SYNDROME OF LEFT KNEE: Primary | ICD-10-CM

## 2024-05-01 PROCEDURE — 99024 POSTOP FOLLOW-UP VISIT: CPT | Performed by: ORTHOPAEDIC SURGERY

## 2024-05-01 PROCEDURE — 1125F AMNT PAIN NOTED PAIN PRSNT: CPT | Performed by: ORTHOPAEDIC SURGERY

## 2024-05-01 PROCEDURE — 1036F TOBACCO NON-USER: CPT | Performed by: ORTHOPAEDIC SURGERY

## 2024-05-01 PROCEDURE — 1159F MED LIST DOCD IN RCRD: CPT | Performed by: ORTHOPAEDIC SURGERY

## 2024-05-01 PROCEDURE — 1160F RVW MEDS BY RX/DR IN RCRD: CPT | Performed by: ORTHOPAEDIC SURGERY

## 2024-05-01 ASSESSMENT — PAIN DESCRIPTION - DESCRIPTORS: DESCRIPTORS: THROBBING;ACHING

## 2024-05-01 ASSESSMENT — PAIN - FUNCTIONAL ASSESSMENT: PAIN_FUNCTIONAL_ASSESSMENT: 0-10

## 2024-05-01 ASSESSMENT — PAIN SCALES - GENERAL: PAINLEVEL_OUTOF10: 2

## 2024-05-01 NOTE — PROGRESS NOTES
Returns for left knee.  Doing well during the day.  Still having nighttime pain.  Difficulty sleeping.  Stop naproxen as it was not helping.  Had some black stools with ibuprofen and stopped that.  He has a history of this and is seeing his GI doctor next month.  Uses IcyHot on the knee.  Continue to ice the knee as needed.  Still doing therapy.    Exam: No visible swelling.  No crepitus through range of motion.    Assessment: Status post left knee arthroscopy.    Plan: Encouraged him to check in with his GI doctor regarding his black stools.  Will try Medrol Dosepak.  Do not see anything really subjective on exam.  Continue with therapy for strengthening.  Low impact exercise.  Follow-up in 1 month.

## 2024-05-03 RX ORDER — METHYLPREDNISOLONE 4 MG/1
TABLET ORAL
Qty: 21 TABLET | Refills: 0 | Status: SHIPPED | OUTPATIENT
Start: 2024-05-03 | End: 2024-05-08

## 2024-05-13 PROBLEM — E11.9 DIABETES MELLITUS, TYPE 2 (MULTI): Status: ACTIVE | Noted: 2023-10-03

## 2024-05-13 PROBLEM — M25.562 KNEE PAIN, LEFT: Status: ACTIVE | Noted: 2024-05-13

## 2024-05-13 PROBLEM — G62.9 POLYNEUROPATHY: Status: ACTIVE | Noted: 2024-05-13

## 2024-05-22 ENCOUNTER — LAB (OUTPATIENT)
Dept: LAB | Facility: LAB | Age: 64
End: 2024-05-22
Payer: MEDICARE

## 2024-05-22 DIAGNOSIS — K92.1 MELENA: Primary | ICD-10-CM

## 2024-05-22 LAB
ERYTHROCYTE [DISTWIDTH] IN BLOOD BY AUTOMATED COUNT: 12.9 % (ref 11.5–14.5)
HCT VFR BLD AUTO: 43.8 % (ref 41–52)
HGB BLD-MCNC: 15.1 G/DL (ref 13.5–17.5)
MCH RBC QN AUTO: 32.8 PG (ref 26–34)
MCHC RBC AUTO-ENTMCNC: 34.5 G/DL (ref 32–36)
MCV RBC AUTO: 95 FL (ref 80–100)
NRBC BLD-RTO: 0 /100 WBCS (ref 0–0)
PLATELET # BLD AUTO: 160 X10*3/UL (ref 150–450)
RBC # BLD AUTO: 4.61 X10*6/UL (ref 4.5–5.9)
WBC # BLD AUTO: 5.8 X10*3/UL (ref 4.4–11.3)

## 2024-05-22 PROCEDURE — 85027 COMPLETE CBC AUTOMATED: CPT

## 2024-05-22 PROCEDURE — 36415 COLL VENOUS BLD VENIPUNCTURE: CPT

## 2024-05-29 ENCOUNTER — OFFICE VISIT (OUTPATIENT)
Dept: CARDIOLOGY | Facility: CLINIC | Age: 64
End: 2024-05-29
Payer: MEDICARE

## 2024-05-29 VITALS
HEART RATE: 68 BPM | HEIGHT: 70 IN | WEIGHT: 170 LBS | OXYGEN SATURATION: 98 % | BODY MASS INDEX: 24.34 KG/M2 | SYSTOLIC BLOOD PRESSURE: 130 MMHG | DIASTOLIC BLOOD PRESSURE: 72 MMHG

## 2024-05-29 DIAGNOSIS — I48.0 PAROXYSMAL ATRIAL FIBRILLATION (MULTI): Primary | ICD-10-CM

## 2024-05-29 PROCEDURE — 1159F MED LIST DOCD IN RCRD: CPT | Performed by: INTERNAL MEDICINE

## 2024-05-29 PROCEDURE — 3050F LDL-C >= 130 MG/DL: CPT | Performed by: INTERNAL MEDICINE

## 2024-05-29 PROCEDURE — 99213 OFFICE O/P EST LOW 20 MIN: CPT | Performed by: INTERNAL MEDICINE

## 2024-05-29 PROCEDURE — 3075F SYST BP GE 130 - 139MM HG: CPT | Performed by: INTERNAL MEDICINE

## 2024-05-29 PROCEDURE — 3078F DIAST BP <80 MM HG: CPT | Performed by: INTERNAL MEDICINE

## 2024-05-29 PROCEDURE — 3044F HG A1C LEVEL LT 7.0%: CPT | Performed by: INTERNAL MEDICINE

## 2024-05-29 PROCEDURE — 3062F POS MACROALBUMINURIA REV: CPT | Performed by: INTERNAL MEDICINE

## 2024-05-29 PROCEDURE — 1160F RVW MEDS BY RX/DR IN RCRD: CPT | Performed by: INTERNAL MEDICINE

## 2024-05-29 PROCEDURE — 1036F TOBACCO NON-USER: CPT | Performed by: INTERNAL MEDICINE

## 2024-05-29 NOTE — PROGRESS NOTES
"History Of Present Illness:      This is a 63-year-old male with a history of atrial fibrillation.  He is having occasional episodes of palpitations described as a skipped beat sensation, likely due to PACs and or PVCs.  No episodes of atrial fibrillation.    Review of Systems  Other review of systems negative     Last Recorded Vitals:      3/12/2024    11:45 AM 3/12/2024    12:00 PM 3/12/2024    12:15 PM 3/12/2024    12:30 PM 3/12/2024    12:45 PM 3/20/2024     8:56 AM 5/29/2024     9:11 AM   Vitals   Systolic 109 116 121 122 143  130   Diastolic 66 56 50 59 59  72   Heart Rate 59 51 54 56 59  68   Temp     36.3 °C (97.3 °F)     Resp 17 14 15 16 15     Height (in)      1.778 m (5' 10\") 1.778 m (5' 10\")   Weight (lb)      170 170   BMI      24.39 kg/m2 24.39 kg/m2   BSA (m2)      1.95 m2 1.95 m2   Visit Report      Report Report          Allergies:  Bee venom protein (honey bee)    Outpatient Medications:  Current Outpatient Medications   Medication Instructions    ascorbic acid (VITAMIN C) 500 mg, oral, Daily    aspirin 81 mg EC tablet 1 tablet, oral, Daily    cholecalciferol (VITAMIN D3) 25 mcg, oral, Daily    DelzicoL 400 mg DR capsule     docosahexaenoic acid/epa (FISH OIL ORAL) 1 tablet, oral, Daily    EPINEPHrine (EPIPEN) 0.3 mg, As Directed    milk thistle 175 mg, oral, Daily    multivitamin tablet 1 tablet, oral, Daily    predniSONE (DELTASONE) 5 mg, oral, As needed    zinc gluconate 50 mg tablet oral, Daily       Physical Exam:    General Appearance:  Alert, oriented, no distress  Skin:  Warm and dry  Head and Neck:  No elevation of JVP, no carotid bruits  Cardiac Exam:  Rhythm is regular, S1 and S2 are normal, grade 2/6 diastolic murmur at the right upper sternal border  Lungs:  Clear to auscultation  Extremities:  no edema  Neurologic:  No focal deficits  Psychiatric:  Appropriate mood and behavior         Cardiology Tests:  I have personally review the diagnostic cardiac testing and my interpretation is " as follows:    EKG: Sinus rhythm with isolated PACs      Assessment/Plan   Problem List Items Addressed This Visit             ICD-10-CM    Paroxysmal atrial fibrillation (Multi) - Primary I48.0     1.  Paroxysmal atrial fibrillation:  Cali is in sinus rhythm today and had a PVI procedure at [facility] in 2008.  He recently purchased a home ECG recording device but has not begun recording any rhythm strips yet.  His symptom description seems consistent with isolated PACs and possibly PVCs.  I have asked him to send recordings when he has them documented so they can be reviewed.  Follow-up in 1 year.    2.  Aortic regurgitation: This has been moderate by echocardiogram.  He will be following up with Dr. Santa as scheduled.            James C Ramicone, DO

## 2024-05-29 NOTE — ASSESSMENT & PLAN NOTE
1.  Paroxysmal atrial fibrillation:  Cali is in sinus rhythm today and had a PVI procedure at [facility] in 2008.  He recently purchased a home ECG recording device but has not begun recording any rhythm strips yet.  His symptom description seems consistent with isolated PACs and possibly PVCs.  I have asked him to send recordings when he has them documented so they can be reviewed.  Follow-up in 1 year.    2.  Aortic regurgitation: This has been moderate by echocardiogram.  He will be following up with Dr. Santa as scheduled.

## 2024-05-29 NOTE — LETTER
"May 29, 2024     Flo CARRINGTON DO  [address]  [address]  [place] [facility] [place] [address]    Patient: Cali Jones   YOB: 1960   Date of Visit: 5/29/2024       Dear Dr. Flo CARRINGTON DO:    Thank you for referring Cali Jones to me for evaluation. Below are my notes for this consultation.  If you have questions, please do not hesitate to call me. I look forward to following your patient along with you.       Sincerely,     James C Ramicone, DO      CC: No Recipients  ______________________________________________________________________________________    History Of Present Illness:      This is a 63-year-old male with a history of atrial fibrillation.  He is having occasional episodes of palpitations described as a skipped beat sensation, likely due to PACs and or PVCs.  No episodes of atrial fibrillation.    Review of Systems  Other review of systems negative     Last Recorded Vitals:      3/12/2024    11:45 AM 3/12/2024    12:00 PM 3/12/2024    12:15 PM 3/12/2024    12:30 PM 3/12/2024    12:45 PM 3/20/2024     8:56 AM 5/29/2024     9:11 AM   Vitals   Systolic 109 116 121 122 143  130   Diastolic 66 56 50 59 59  72   Heart Rate 59 51 54 56 59  68   Temp     36.3 °C (97.3 °F)     Resp 17 14 15 16 15     Height (in)      1.778 m (5' 10\") 1.778 m (5' 10\")   Weight (lb)      170 170   BMI      24.39 kg/m2 24.39 kg/m2   BSA (m2)      1.95 m2 1.95 m2   Visit Report      Report Report          Allergies:  Bee venom protein (honey bee)    Outpatient Medications:  Current Outpatient Medications   Medication Instructions   • ascorbic acid (VITAMIN C) 500 mg, oral, Daily   • aspirin 81 mg EC tablet 1 tablet, oral, Daily   • cholecalciferol (VITAMIN D3) 25 mcg, oral, Daily   • DelzicoL 400 mg DR capsule    • docosahexaenoic acid/epa (FISH OIL ORAL) 1 tablet, oral, Daily   • EPINEPHrine (EPIPEN) 0.3 mg, As Directed   • milk thistle 175 mg, oral, Daily   • multivitamin tablet 1 tablet, oral, Daily "   • predniSONE (DELTASONE) 5 mg, oral, As needed   • zinc gluconate 50 mg tablet oral, Daily       Physical Exam:    General Appearance:  Alert, oriented, no distress  Skin:  Warm and dry  Head and Neck:  No elevation of JVP, no carotid bruits  Cardiac Exam:  Rhythm is regular, S1 and S2 are normal, grade 2/6 diastolic murmur at the right upper sternal border  Lungs:  Clear to auscultation  Extremities:  no edema  Neurologic:  No focal deficits  Psychiatric:  Appropriate mood and behavior         Cardiology Tests:  I have personally review the diagnostic cardiac testing and my interpretation is as follows:    EKG: Sinus rhythm with isolated PACs      Assessment/Plan  Problem List Items Addressed This Visit             ICD-10-CM    Paroxysmal atrial fibrillation (Multi) - Primary I48.0     1.  Paroxysmal atrial fibrillation:  Cali is in sinus rhythm today and had a PVI procedure at [facility] in 2008.  He recently purchased a home ECG recording device but has not begun recording any rhythm strips yet.  His symptom description seems consistent with isolated PACs and possibly PVCs.  I have asked him to send recordings when he has them documented so they can be reviewed.  Follow-up in 1 year.    2.  Aortic regurgitation: This has been moderate by echocardiogram.  He will be following up with Dr. Santa as scheduled.            James C Ramicone, DO

## 2024-08-15 ENCOUNTER — HOSPITAL ENCOUNTER (OUTPATIENT)
Dept: CARDIOLOGY | Facility: CLINIC | Age: 64
Discharge: HOME | End: 2024-08-15
Payer: MEDICARE

## 2024-08-15 DIAGNOSIS — I35.1 MODERATE AORTIC VALVE INSUFFICIENCY: ICD-10-CM

## 2024-08-15 PROCEDURE — 93306 TTE W/DOPPLER COMPLETE: CPT | Performed by: INTERNAL MEDICINE

## 2024-08-15 PROCEDURE — 93306 TTE W/DOPPLER COMPLETE: CPT

## 2024-08-16 LAB
AORTIC VALVE PEAK VELOCITY: 2 M/S
AV PEAK GRADIENT: 16 MMHG
AVA (PEAK VEL): 4.03 CM2
EJECTION FRACTION APICAL 4 CHAMBER: 62.7
EJECTION FRACTION: 63 %
LEFT ATRIUM VOLUME AREA LENGTH INDEX BSA: 42.1 ML/M2
LEFT VENTRICLE INTERNAL DIMENSION DIASTOLE: 6.48 CM (ref 3.5–6)
LEFT VENTRICULAR OUTFLOW TRACT DIAMETER: 2.55 CM
RIGHT VENTRICLE FREE WALL PEAK S': 8 CM/S
RIGHT VENTRICLE PEAK SYSTOLIC PRESSURE: 21.2 MMHG
TRICUSPID ANNULAR PLANE SYSTOLIC EXCURSION: 1.6 CM

## 2024-09-04 ENCOUNTER — HOSPITAL ENCOUNTER (OUTPATIENT)
Dept: RADIOLOGY | Facility: CLINIC | Age: 64
Discharge: HOME | End: 2024-09-04
Payer: MEDICARE

## 2024-09-04 DIAGNOSIS — E04.2 NONTOXIC MULTINODULAR GOITER: ICD-10-CM

## 2024-09-04 DIAGNOSIS — R93.89 ABNORMAL FINDINGS ON DIAGNOSTIC IMAGING OF OTHER SPECIFIED BODY STRUCTURES: ICD-10-CM

## 2024-09-04 PROCEDURE — 76536 US EXAM OF HEAD AND NECK: CPT | Performed by: RADIOLOGY

## 2024-09-04 PROCEDURE — 76536 US EXAM OF HEAD AND NECK: CPT

## 2024-09-23 PROBLEM — I35.1 AORTIC VALVE INSUFFICIENCY: Chronic | Status: ACTIVE | Noted: 2023-04-27

## 2024-09-23 PROBLEM — E78.5 HYPERLIPIDEMIA: Chronic | Status: ACTIVE | Noted: 2023-04-27

## 2024-09-23 PROBLEM — I48.0 PAROXYSMAL ATRIAL FIBRILLATION (MULTI): Chronic | Status: ACTIVE | Noted: 2023-04-27

## 2024-09-26 ENCOUNTER — LAB (OUTPATIENT)
Dept: LAB | Facility: LAB | Age: 64
End: 2024-09-26
Payer: MEDICARE

## 2024-09-26 DIAGNOSIS — E03.9 HYPOTHYROIDISM, UNSPECIFIED: Primary | ICD-10-CM

## 2024-09-26 DIAGNOSIS — E55.9 VITAMIN D DEFICIENCY, UNSPECIFIED: ICD-10-CM

## 2024-09-26 LAB
ALBUMIN SERPL BCP-MCNC: 4.3 G/DL (ref 3.4–5)
ALP SERPL-CCNC: 69 U/L (ref 33–136)
ALT SERPL W P-5'-P-CCNC: 15 U/L (ref 10–52)
ANION GAP SERPL CALC-SCNC: 9 MMOL/L (ref 10–20)
AST SERPL W P-5'-P-CCNC: 17 U/L (ref 9–39)
BILIRUB SERPL-MCNC: 0.6 MG/DL (ref 0–1.2)
BUN SERPL-MCNC: 24 MG/DL (ref 6–23)
CALCIUM SERPL-MCNC: 9.1 MG/DL (ref 8.6–10.3)
CHLORIDE SERPL-SCNC: 109 MMOL/L (ref 98–107)
CO2 SERPL-SCNC: 29 MMOL/L (ref 21–32)
CREAT SERPL-MCNC: 1 MG/DL (ref 0.5–1.3)
EGFRCR SERPLBLD CKD-EPI 2021: 84 ML/MIN/1.73M*2
GLUCOSE SERPL-MCNC: 71 MG/DL (ref 74–99)
POTASSIUM SERPL-SCNC: 4.4 MMOL/L (ref 3.5–5.3)
PROT SERPL-MCNC: 6.1 G/DL (ref 6.4–8.2)
SODIUM SERPL-SCNC: 143 MMOL/L (ref 136–145)
T3FREE SERPL-MCNC: 3.3 PG/ML (ref 2.3–4.2)
T4 FREE SERPL-MCNC: 1.06 NG/DL (ref 0.61–1.12)
TSH SERPL-ACNC: 2.6 MIU/L (ref 0.44–3.98)

## 2024-09-26 PROCEDURE — 84443 ASSAY THYROID STIM HORMONE: CPT

## 2024-09-26 PROCEDURE — 84439 ASSAY OF FREE THYROXINE: CPT

## 2024-09-26 PROCEDURE — 82306 VITAMIN D 25 HYDROXY: CPT

## 2024-09-26 PROCEDURE — 84481 FREE ASSAY (FT-3): CPT

## 2024-09-26 PROCEDURE — 36415 COLL VENOUS BLD VENIPUNCTURE: CPT

## 2024-09-26 PROCEDURE — 80053 COMPREHEN METABOLIC PANEL: CPT

## 2024-09-27 LAB — 25(OH)D3 SERPL-MCNC: 56 NG/ML (ref 30–100)

## 2024-10-14 PROBLEM — M23.92 INTERNAL DERANGEMENT OF LEFT KNEE: Status: RESOLVED | Noted: 2024-02-28 | Resolved: 2024-10-14

## 2024-10-14 PROBLEM — Z12.5 SCREENING FOR PROSTATE CANCER: Status: RESOLVED | Noted: 2024-03-08 | Resolved: 2024-10-14

## 2024-10-14 PROBLEM — U07.1 COVID-19: Status: RESOLVED | Noted: 2023-04-27 | Resolved: 2024-10-14

## 2024-10-14 PROBLEM — B35.6 TINEA CRURIS: Status: RESOLVED | Noted: 2023-04-27 | Resolved: 2024-10-14

## 2024-10-14 PROBLEM — G47.33 OSA (OBSTRUCTIVE SLEEP APNEA): Chronic | Status: ACTIVE | Noted: 2023-04-27

## 2024-10-14 PROBLEM — J35.1 LINGUAL TONSIL HYPERTROPHY: Status: RESOLVED | Noted: 2023-04-27 | Resolved: 2024-10-14

## 2024-10-14 PROBLEM — Z13.6 SCREENING FOR CARDIOVASCULAR CONDITION: Status: RESOLVED | Noted: 2024-03-08 | Resolved: 2024-10-14

## 2024-10-14 PROBLEM — R09.89 THROAT FULLNESS: Status: RESOLVED | Noted: 2023-04-27 | Resolved: 2024-10-14

## 2024-10-14 PROBLEM — J02.9 SORE THROAT: Status: RESOLVED | Noted: 2023-04-27 | Resolved: 2024-10-14

## 2024-10-14 PROBLEM — Z23 NEED FOR VACCINATION: Status: RESOLVED | Noted: 2024-03-08 | Resolved: 2024-10-14

## 2024-10-14 PROBLEM — M25.562 KNEE PAIN, LEFT: Status: RESOLVED | Noted: 2024-05-13 | Resolved: 2024-10-14

## 2024-10-14 PROBLEM — J02.9 ACUTE VIRAL PHARYNGITIS: Status: RESOLVED | Noted: 2023-04-27 | Resolved: 2024-10-14

## 2024-10-14 PROBLEM — J06.9 VIRAL UPPER RESPIRATORY TRACT INFECTION: Status: RESOLVED | Noted: 2023-04-27 | Resolved: 2024-10-14

## 2024-10-14 PROBLEM — Z00.00 ROUTINE GENERAL MEDICAL EXAMINATION AT HEALTH CARE FACILITY: Status: RESOLVED | Noted: 2024-03-08 | Resolved: 2024-10-14

## 2024-10-14 PROBLEM — T78.40XA ALLERGIES: Status: RESOLVED | Noted: 2023-04-27 | Resolved: 2024-10-14

## 2024-10-14 PROBLEM — K51.50 LEFT SIDED COLITIS WITHOUT COMPLICATIONS (MULTI): Status: RESOLVED | Noted: 2024-03-08 | Resolved: 2024-10-14

## 2024-10-14 PROBLEM — S81.819A LACERATION OF LEG: Status: RESOLVED | Noted: 2023-04-27 | Resolved: 2024-10-14

## 2024-10-14 PROBLEM — Z20.822 SUSPECTED COVID-19 VIRUS INFECTION: Status: RESOLVED | Noted: 2023-04-27 | Resolved: 2024-10-14

## 2024-10-14 PROBLEM — E11.9 DIABETES MELLITUS, TYPE 2 (MULTI): Chronic | Status: ACTIVE | Noted: 2023-10-03

## 2024-10-14 NOTE — PROGRESS NOTES
"Referred by No ref. provider found    HPI I'm seeing Marvin for a yearly follow up. Over the last few months he's been noting SOB with any exertion other then walking. No CP/pressure. No edema. He still exercises daily and isn't more out of shape.  His LDL has gone up 175.    Past Medical History:  Problem List Items Addressed This Visit    None     Past Medical History:   Diagnosis Date    A-fib (Multi)     low dose asa, no issues in 13 years    Aortic valve insufficiency 04/27/2023    Aortic valve regurgitation     moderate, last echo 8/16/23    Arthritis     chronic pain past 20 years - prednisone prn    BPH (benign prostatic hyperplasia)     Fibromyalgia     chronic pain\"all over\" takes prednisone as needed    GERD (gastroesophageal reflux disease)     under control    Hyperlipidemia 04/27/2023    Dr. Bowman follows ... CAC = 0       Migraines     resolved per patient    Noninfective gastroenteritis and colitis, unspecified     Colitis    DICK (obstructive sleep apnea)     had boderline sleep study, no CPAP    Paroxysmal atrial fibrillation (Multi) 04/27/2023    s/p A-fib ablation at [facility] 2008   Follows with Dr. James Ramicone  Not on AC d/t his choice   Has occasional break-through episodes       Thyroiditis     yearly ultrasound - no present medication    Ulcerative colitis (Multi)     managed for last 50 years on Delzical      Past Surgical History:  He has a past surgical history that includes Hemorrhoid surgery; Ulnar nerve transposition (Right); and Ablation of dysrhythmic focus.      Social History:  He reports that he has never smoked. He has never been exposed to tobacco smoke. He has never used smokeless tobacco. He reports that he does not currently use alcohol. He reports that he does not use drugs.    Family History:  Family History   Problem Relation Name Age of Onset    Transient ischemic attack Mother      Heart attack Father      Breast cancer Sister      Thyroid disease Sister      No Known Problems " Brother       Allergies:  Bee venom protein (honey bee)    Outpatient Medications:  Current Outpatient Medications   Medication Instructions    ascorbic acid (VITAMIN C) 500 mg, oral, Daily    aspirin 81 mg EC tablet 1 tablet, oral, Daily    cholecalciferol (VITAMIN D3) 25 mcg, oral, Daily    DelzicoL 400 mg DR capsule     docosahexaenoic acid/epa (FISH OIL ORAL) 1 tablet, oral, Daily    EPINEPHrine (EPIPEN) 0.3 mg, As Directed    milk thistle 175 mg, oral, Daily    multivitamin tablet 1 tablet, oral, Daily    predniSONE (DELTASONE) 5 mg, oral, As needed    zinc gluconate 50 mg tablet oral, Daily     Last Recorded Vitals:  There were no vitals filed for this visit.    Physical Exam  Patient is alert and oriented x3.  HEENT is unremarkable mucous members are moist  Neck no JVP no bruits upstrokes are full no thyromegaly  Lungs are clear bilaterally.  No wheezing crackles or rales  Heart regular rhythm normal S1-S2 there is no S3 soft ZAINAB, soft early DM  Abdomen is soft bs are positive nontender nondistended no organomegaly no pulsatile masses  Extremities have no edema.  Distal pulses present palpable.  Neuro is grossly nonfocal  Skin has no rashes     Last Labs:  CBC -  Lab Results   Component Value Date    WBC 5.8 05/22/2024    HGB 15.1 05/22/2024    HCT 43.8 05/22/2024    MCV 95 05/22/2024     05/22/2024     CMP -  Lab Results   Component Value Date    CALCIUM 9.1 09/26/2024    PROT 6.1 (L) 09/26/2024    ALBUMIN 4.3 09/26/2024    AST 17 09/26/2024    ALT 15 09/26/2024    ALKPHOS 69 09/26/2024    BILITOT 0.6 09/26/2024     LIPID PANEL -   Lab Results   Component Value Date    CHOL 244 (H) 03/08/2024    HDL 49.9 03/08/2024    CHHDL 4.9 03/08/2024    VLDL 22 03/08/2024    TRIG 112 03/08/2024    NHDL 194 (H) 03/08/2024     RENAL FUNCTION PANEL -   Lab Results   Component Value Date    K 4.4 09/26/2024     Lab Results   Component Value Date    HGBA1C 5.2 03/08/2024        Procedure    Echo 8/15/2024 EF 60%, LAE,  moderate AI, TAA 3.9 cm    ECHO [08/16/2023]: Est EF 60%. Mod AR.     ECHO [09/13/2022]: LVSF normal, EF 60-65%. Mod AR.     ECHO [10/05/2021]: EF 55-60%. AV has excellent excursion. The elevated gradient is related to severity of AI. Mod-sev AR.     HOLTER [06/09/2020 â€“ 06/12/2020]: SR, -53. No ep/of A-fib. Short runs atrial tachycardia present, longest 12 beats. No ep/of high-grade AV block. No ventricular tachycardia.     CT / SCORING [05/15/2020] = 0 . . . Mild patchy bibasilar infiltrates and/or atelectasis. Mildly prominent nonspecific mediastinal nodes.      EX NST (09/18/2018): exercised 10.22 Normal well-maint LVF 53% EF     Pulmonary Vein Isolation [01/28/2008 @ [facility]]     CARDIOVERSION (08/02/2007) = Successful     Assessment/Plan   1. Atrial fibrillation. That is post PVI [facility] 2008. Chads vascular score is 1 (AGE).  He does have a [employer] mobile but has not really been using it.  I encouraged him to use it on a regular basis.     2. Aortic regurgitation.  His echo 8/15/2024 reveals moderate aortic regurgitation with a TAA 3.9 cm.  This is stable.    3. I shortness of breath.  This is new over the last few months.  He is still active and walks quite a bit.  It is only when doing things like playing basketball with his son.  This is never caused him to be short of breath before.  An EKG will be done today.  I have asked for him to have his calcium score repeated.  In May 2020 it was 0.  I have asked for him to have an exercise nuclear stress test.  Last tress test was in 2018.    4.  Hyperlipidemia.  3/8/2024  HDL 50 triglycerides 112.  His LDL seems to go up in March with an comes down in October.  I utilized a calcium score to determine if therapy is required.    EKG today.  Exercise nuclear stress test.  Calcium score.  He should call us 1 week after all the test are completed so we can discuss that.  Return 1 year.    Addendum    His EKG reveals a probable  junctional rhythm with retrograde P waves.  This may certainly be contributing to his shortness of breath.  Will apply 24-hour Holter and see what his chronotropic response and the stress test is    Karthik Santa MD     Instructions and follow up

## 2024-10-15 ENCOUNTER — APPOINTMENT (OUTPATIENT)
Dept: CARDIOLOGY | Facility: CLINIC | Age: 64
End: 2024-10-15
Payer: MEDICARE

## 2024-10-15 ENCOUNTER — HOSPITAL ENCOUNTER (OUTPATIENT)
Dept: CARDIOLOGY | Facility: CLINIC | Age: 64
Discharge: HOME | End: 2024-10-15
Payer: MEDICARE

## 2024-10-15 VITALS
HEART RATE: 58 BPM | DIASTOLIC BLOOD PRESSURE: 68 MMHG | OXYGEN SATURATION: 100 % | BODY MASS INDEX: 24.68 KG/M2 | WEIGHT: 172 LBS | SYSTOLIC BLOOD PRESSURE: 112 MMHG

## 2024-10-15 DIAGNOSIS — R00.1 BRADYCARDIA, SINUS: ICD-10-CM

## 2024-10-15 DIAGNOSIS — I35.1 NONRHEUMATIC AORTIC VALVE INSUFFICIENCY: Primary | Chronic | ICD-10-CM

## 2024-10-15 DIAGNOSIS — R06.02 SHORTNESS OF BREATH: ICD-10-CM

## 2024-10-15 DIAGNOSIS — I48.0 PAROXYSMAL ATRIAL FIBRILLATION (MULTI): Chronic | ICD-10-CM

## 2024-10-15 DIAGNOSIS — E78.2 MIXED HYPERLIPIDEMIA: Chronic | ICD-10-CM

## 2024-10-15 PROCEDURE — 93010 ELECTROCARDIOGRAM REPORT: CPT | Performed by: INTERNAL MEDICINE

## 2024-10-15 PROCEDURE — 99214 OFFICE O/P EST MOD 30 MIN: CPT | Performed by: INTERNAL MEDICINE

## 2024-10-15 PROCEDURE — 3044F HG A1C LEVEL LT 7.0%: CPT | Performed by: INTERNAL MEDICINE

## 2024-10-15 PROCEDURE — 93225 XTRNL ECG REC<48 HRS REC: CPT

## 2024-10-15 PROCEDURE — 1036F TOBACCO NON-USER: CPT | Performed by: INTERNAL MEDICINE

## 2024-10-15 PROCEDURE — 1160F RVW MEDS BY RX/DR IN RCRD: CPT | Performed by: INTERNAL MEDICINE

## 2024-10-15 PROCEDURE — 3062F POS MACROALBUMINURIA REV: CPT | Performed by: INTERNAL MEDICINE

## 2024-10-15 PROCEDURE — 3074F SYST BP LT 130 MM HG: CPT | Performed by: INTERNAL MEDICINE

## 2024-10-15 PROCEDURE — 3050F LDL-C >= 130 MG/DL: CPT | Performed by: INTERNAL MEDICINE

## 2024-10-15 PROCEDURE — 93005 ELECTROCARDIOGRAM TRACING: CPT | Performed by: INTERNAL MEDICINE

## 2024-10-15 PROCEDURE — 1159F MED LIST DOCD IN RCRD: CPT | Performed by: INTERNAL MEDICINE

## 2024-10-15 PROCEDURE — 3078F DIAST BP <80 MM HG: CPT | Performed by: INTERNAL MEDICINE

## 2024-10-15 NOTE — PATIENT INSTRUCTIONS
1. Atrial fibrillation. That is post PVI [facility] 2008. Chads vascular score is 1 (AGE).  He does have a [employer] mobile but has not really been using it.  I encouraged him to use it on a regular basis.     2. Aortic regurgitation.  His echo 8/15/2024 reveals moderate aortic regurgitation with a TAA 3.9 cm.  This is stable.    3. I shortness of breath.  This is new over the last few months.  He is still active and walks quite a bit.  It is only when doing things like playing basketball with his son.  This is never caused him to be short of breath before.  An EKG will be done today.  I have asked for him to have his calcium score repeated.  In May 2020 it was 0.  I have asked for him to have an exercise nuclear stress test.  Last tress test was in 2018.    4.  Hyperlipidemia.  3/8/2024  HDL 50 triglycerides 112.  His LDL seems to go up in March with an comes down in October.  I utilized a calcium score to determine if therapy is required.    EKG today.  Exercise nuclear stress test.  Calcium score.  He should call us 1 week after all the test are completed so we can discuss that.  Return 1 year.  
37

## 2024-10-22 ENCOUNTER — APPOINTMENT (OUTPATIENT)
Dept: RADIOLOGY | Facility: CLINIC | Age: 64
End: 2024-10-22
Payer: MEDICARE

## 2024-10-22 ENCOUNTER — APPOINTMENT (OUTPATIENT)
Dept: CARDIOLOGY | Facility: CLINIC | Age: 64
End: 2024-10-22
Payer: MEDICARE

## 2024-10-28 ENCOUNTER — HOSPITAL ENCOUNTER (OUTPATIENT)
Dept: RADIOLOGY | Facility: CLINIC | Age: 64
Discharge: HOME | End: 2024-10-28
Payer: MEDICARE

## 2024-10-28 ENCOUNTER — HOSPITAL ENCOUNTER (OUTPATIENT)
Dept: CARDIOLOGY | Facility: CLINIC | Age: 64
Discharge: HOME | End: 2024-10-28
Payer: MEDICARE

## 2024-10-28 DIAGNOSIS — I49.8 JUNCTIONAL CARDIAC ARRHYTHMIA: ICD-10-CM

## 2024-10-28 DIAGNOSIS — I35.1 NONRHEUMATIC AORTIC VALVE INSUFFICIENCY: Chronic | ICD-10-CM

## 2024-10-28 DIAGNOSIS — R06.02 SHORTNESS OF BREATH: ICD-10-CM

## 2024-10-28 PROCEDURE — 93017 CV STRESS TEST TRACING ONLY: CPT

## 2024-10-28 PROCEDURE — 93018 CV STRESS TEST I&R ONLY: CPT | Performed by: INTERNAL MEDICINE

## 2024-10-28 PROCEDURE — 3430000001 HC RX 343 DIAGNOSTIC RADIOPHARMACEUTICALS: Performed by: INTERNAL MEDICINE

## 2024-10-28 PROCEDURE — 78452 HT MUSCLE IMAGE SPECT MULT: CPT

## 2024-10-28 PROCEDURE — 78452 HT MUSCLE IMAGE SPECT MULT: CPT | Performed by: INTERNAL MEDICINE

## 2024-10-28 PROCEDURE — A9500 TC99M SESTAMIBI: HCPCS | Performed by: INTERNAL MEDICINE

## 2024-10-28 PROCEDURE — 93016 CV STRESS TEST SUPVJ ONLY: CPT | Performed by: INTERNAL MEDICINE

## 2024-10-28 RX ORDER — TETRAKIS(2-METHOXYISOBUTYLISOCYANIDE)COPPER(I) TETRAFLUOROBORATE 1 MG/ML
29.4 INJECTION, POWDER, LYOPHILIZED, FOR SOLUTION INTRAVENOUS
Status: COMPLETED | OUTPATIENT
Start: 2024-10-28 | End: 2024-10-28

## 2024-10-28 RX ORDER — TETRAKIS(2-METHOXYISOBUTYLISOCYANIDE)COPPER(I) TETRAFLUOROBORATE 1 MG/ML
9.6 INJECTION, POWDER, LYOPHILIZED, FOR SOLUTION INTRAVENOUS
Status: COMPLETED | OUTPATIENT
Start: 2024-10-28 | End: 2024-10-28

## 2024-10-29 ENCOUNTER — APPOINTMENT (OUTPATIENT)
Dept: CARDIOLOGY | Facility: CLINIC | Age: 64
End: 2024-10-29
Payer: MEDICARE

## 2024-11-07 ENCOUNTER — TELEPHONE (OUTPATIENT)
Dept: CARDIOLOGY | Facility: CLINIC | Age: 64
End: 2024-11-07
Payer: MEDICARE

## 2024-11-08 NOTE — TELEPHONE ENCOUNTER
Left detailed message for patient.  Stress showed normal perfusion.  Holter showed SR with average HR of 58.  No critical findings.  We do not have results for CT Calcium Score.  Patient has follow up scheduled for February 2025.  Please let us know at the office if you are having any symptoms prior to follow up.

## 2024-12-06 ENCOUNTER — OFFICE VISIT (OUTPATIENT)
Dept: PRIMARY CARE | Facility: CLINIC | Age: 64
End: 2024-12-06
Payer: MEDICARE

## 2024-12-06 VITALS
TEMPERATURE: 97.7 F | OXYGEN SATURATION: 98 % | WEIGHT: 175.4 LBS | SYSTOLIC BLOOD PRESSURE: 110 MMHG | DIASTOLIC BLOOD PRESSURE: 58 MMHG | HEIGHT: 70 IN | BODY MASS INDEX: 25.11 KG/M2 | HEART RATE: 97 BPM

## 2024-12-06 DIAGNOSIS — I48.0 PAROXYSMAL ATRIAL FIBRILLATION (MULTI): Chronic | ICD-10-CM

## 2024-12-06 DIAGNOSIS — S76.311A STRAIN OF RIGHT HAMSTRING MUSCLE, INITIAL ENCOUNTER: Primary | ICD-10-CM

## 2024-12-06 PROCEDURE — 1125F AMNT PAIN NOTED PAIN PRSNT: CPT | Performed by: FAMILY MEDICINE

## 2024-12-06 PROCEDURE — 1036F TOBACCO NON-USER: CPT | Performed by: FAMILY MEDICINE

## 2024-12-06 PROCEDURE — 99214 OFFICE O/P EST MOD 30 MIN: CPT | Performed by: FAMILY MEDICINE

## 2024-12-06 PROCEDURE — 3044F HG A1C LEVEL LT 7.0%: CPT | Performed by: FAMILY MEDICINE

## 2024-12-06 PROCEDURE — 3062F POS MACROALBUMINURIA REV: CPT | Performed by: FAMILY MEDICINE

## 2024-12-06 PROCEDURE — G2211 COMPLEX E/M VISIT ADD ON: HCPCS | Performed by: FAMILY MEDICINE

## 2024-12-06 PROCEDURE — 1159F MED LIST DOCD IN RCRD: CPT | Performed by: FAMILY MEDICINE

## 2024-12-06 PROCEDURE — 1160F RVW MEDS BY RX/DR IN RCRD: CPT | Performed by: FAMILY MEDICINE

## 2024-12-06 PROCEDURE — 3078F DIAST BP <80 MM HG: CPT | Performed by: FAMILY MEDICINE

## 2024-12-06 PROCEDURE — 3050F LDL-C >= 130 MG/DL: CPT | Performed by: FAMILY MEDICINE

## 2024-12-06 PROCEDURE — 3074F SYST BP LT 130 MM HG: CPT | Performed by: FAMILY MEDICINE

## 2024-12-06 PROCEDURE — 3008F BODY MASS INDEX DOCD: CPT | Performed by: FAMILY MEDICINE

## 2024-12-06 RX ORDER — TRAMADOL HYDROCHLORIDE 50 MG/1
50 TABLET ORAL EVERY 8 HOURS PRN
Qty: 10 TABLET | Refills: 0 | Status: SHIPPED | OUTPATIENT
Start: 2024-12-06 | End: 2024-12-11

## 2024-12-06 ASSESSMENT — ENCOUNTER SYMPTOMS
MYALGIAS: 1
FEVER: 0
CHILLS: 0

## 2024-12-06 ASSESSMENT — PAIN SCALES - GENERAL: PAINLEVEL_OUTOF10: 10-WORST PAIN EVER

## 2024-12-06 NOTE — PROGRESS NOTES
Subjective   Patient ID: Cali Jones is a 64 y.o. male who presents for Muscle Pain (Patient is here for tore hamstring).  HPI  64-year-old male with history of paroxysmal atrial fibrillation presents with right hamstring injury.  Happened while he was fishing.  He is lost his balance while standing on a log.  He has significant bruising, pain, difficulty with his gait.  He is having hard time falling asleep at night due to the pain.  Review of Systems   Constitutional:  Negative for chills and fever.   Musculoskeletal:  Positive for myalgias.   All other systems reviewed and are negative.      Visit Vitals  /58 (BP Location: Left arm, Patient Position: Sitting, BP Cuff Size: Adult)   Pulse 97   Temp 36.5 °C (97.7 °F) (Temporal)        Objective   Physical Exam  Vitals reviewed.   Constitutional:       Appearance: Normal appearance.   Cardiovascular:      Rate and Rhythm: Regular rhythm.      Heart sounds: Normal heart sounds.   Musculoskeletal:         General: Swelling and tenderness present.   Skin:     Findings: Bruising present.   Neurological:      Mental Status: He is alert.   Psychiatric:         Mood and Affect: Mood normal.         Behavior: Behavior normal.         Assessment/Plan   Problem List Items Addressed This Visit             ICD-10-CM    Paroxysmal atrial fibrillation (Multi) (Chronic) I48.0    Right hamstring muscle strain - Primary S76.311A    Relevant Medications    traMADol (Ultram) 50 mg tablet    Other Relevant Orders    Referral to Orthopaedic Surgery

## 2024-12-11 ENCOUNTER — APPOINTMENT (OUTPATIENT)
Dept: ORTHOPEDIC SURGERY | Facility: CLINIC | Age: 64
End: 2024-12-11
Payer: MEDICARE

## 2024-12-11 VITALS — HEIGHT: 70 IN | WEIGHT: 175 LBS | BODY MASS INDEX: 25.05 KG/M2

## 2024-12-11 DIAGNOSIS — S76.311S STRAIN OF RIGHT HAMSTRING MUSCLE, SEQUELA: Primary | ICD-10-CM

## 2024-12-11 PROCEDURE — 99214 OFFICE O/P EST MOD 30 MIN: CPT | Performed by: ORTHOPAEDIC SURGERY

## 2024-12-11 PROCEDURE — 3008F BODY MASS INDEX DOCD: CPT | Performed by: ORTHOPAEDIC SURGERY

## 2024-12-11 PROCEDURE — 3062F POS MACROALBUMINURIA REV: CPT | Performed by: ORTHOPAEDIC SURGERY

## 2024-12-11 PROCEDURE — 3050F LDL-C >= 130 MG/DL: CPT | Performed by: ORTHOPAEDIC SURGERY

## 2024-12-11 PROCEDURE — 1125F AMNT PAIN NOTED PAIN PRSNT: CPT | Performed by: ORTHOPAEDIC SURGERY

## 2024-12-11 PROCEDURE — 1159F MED LIST DOCD IN RCRD: CPT | Performed by: ORTHOPAEDIC SURGERY

## 2024-12-11 PROCEDURE — 1036F TOBACCO NON-USER: CPT | Performed by: ORTHOPAEDIC SURGERY

## 2024-12-11 PROCEDURE — 3044F HG A1C LEVEL LT 7.0%: CPT | Performed by: ORTHOPAEDIC SURGERY

## 2024-12-11 ASSESSMENT — PAIN - FUNCTIONAL ASSESSMENT: PAIN_FUNCTIONAL_ASSESSMENT: 0-10

## 2024-12-11 ASSESSMENT — PAIN DESCRIPTION - DESCRIPTORS: DESCRIPTORS: THROBBING;SHARP;ACHING

## 2024-12-11 ASSESSMENT — PAIN SCALES - GENERAL: PAINLEVEL_OUTOF10: 7

## 2024-12-11 NOTE — PROGRESS NOTES
64-year-old male here for right leg injury.  Was fishing on a log about 3 weeks ago when his leg suddenly gave out.  Planes of pain around the posterior aspect of his right knee.  Noted some bruising.  No previous problems with his right knee.  Left knee is doing well.    WD/WN thin male  A+O X3  No lymphedema  Inspection of both knees shows symmetric alignment.   No flexion contracture.   No crepitus through range of motion.   Tender to palpation along the posterior right thigh along the hamstrings.  Some pain with resisted right knee flexion.  Tender along medial compartment right knee.   5/5 strength in quads and hamstrings.   Stable varus/valgus stress.   (-) Lachman.   (-) Carrillo.   Sensation intact to LT.   Good pulses in both legs.   Small right knee effusion.   No erythema.    Assessment: Right posterior thigh hamstring strain.  Possible knee arthrosis.    Plan: Discussed nonoperative and operative options in detail.   Risk and benefits discussed in detail. All questions answered today.  Recovery timeline and expectations discussed in detail.  May have some arthritis in the knee though we do not have an x-ray.  The knee pain is really not his biggest problem but the pain in the hamstring muscle is.  Have recommended physical therapy to work on ultrasound/e-stim to help the muscle recovery.  Discussed no need for advanced imaging this is really a muscular injury which should heal up with time and therapy.

## 2025-01-06 ENCOUNTER — TELEPHONE (OUTPATIENT)
Dept: PRIMARY CARE | Facility: CLINIC | Age: 65
End: 2025-01-06
Payer: MEDICARE

## 2025-01-06 NOTE — TELEPHONE ENCOUNTER
Patient woke up today with loss of hearing in one ear and is requesting a referral for a ENT Dr. Please advise. Ty

## 2025-01-10 ENCOUNTER — APPOINTMENT (OUTPATIENT)
Dept: PRIMARY CARE | Facility: CLINIC | Age: 65
End: 2025-01-10
Payer: MEDICARE

## 2025-01-14 ENCOUNTER — HOSPITAL ENCOUNTER (OUTPATIENT)
Dept: RADIOLOGY | Facility: CLINIC | Age: 65
Discharge: HOME | End: 2025-01-14
Payer: MEDICARE

## 2025-01-14 DIAGNOSIS — I35.1 NONRHEUMATIC AORTIC VALVE INSUFFICIENCY: Chronic | ICD-10-CM

## 2025-01-14 DIAGNOSIS — R06.02 SHORTNESS OF BREATH: ICD-10-CM

## 2025-01-14 PROCEDURE — 75571 CT HRT W/O DYE W/CA TEST: CPT

## 2025-01-28 PROBLEM — R93.1 AGATSTON CORONARY ARTERY CALCIUM SCORE LESS THAN 100: Chronic | Status: ACTIVE | Noted: 2025-01-28

## 2025-02-18 PROBLEM — R93.1 AGATSTON CORONARY ARTERY CALCIUM SCORE LESS THAN 100: Chronic | Status: RESOLVED | Noted: 2025-01-28 | Resolved: 2025-02-18

## 2025-02-18 PROBLEM — S76.311A RIGHT HAMSTRING MUSCLE STRAIN: Status: RESOLVED | Noted: 2024-12-06 | Resolved: 2025-02-18

## 2025-02-18 NOTE — PROGRESS NOTES
"Referred by Kiet GARCÍA I'm seeing Marvin for a 4 month follow up. Still with RAMOS with vigorous exertion only. No CP.  He exercises regularly.  He walks 4 to 5 miles without difficulty.  However if he is doing something more vigorous like running and playing basketball the son he runs out of steam.  No dizziness no lightheadedness no passing out.    Past Medical History:  Problem List Items Addressed This Visit    None     Past Medical History:   Diagnosis Date    A-fib (Multi)     low dose asa, no issues in 13 years    Agatston coronary artery calcium score less than 100 01/28/2025 1/14/2025 = 0      Aortic valve insufficiency 04/27/2023    Aortic valve regurgitation     moderate, last echo 8/16/23    Arthritis     chronic pain past 20 years - prednisone prn    BPH (benign prostatic hyperplasia)     Diabetes mellitus, type 2 (Multi) 10/03/2023    Fibromyalgia     chronic pain\"all over\" takes prednisone as needed    GERD (gastroesophageal reflux disease)     under control    Hyperlipidemia 04/27/2023    Dr. Bowman follows ... CAC = 0       Migraines     resolved per patient    Noninfective gastroenteritis and colitis, unspecified     Colitis    DICK (obstructive sleep apnea)     had boderline sleep study, no CPAP    Paroxysmal atrial fibrillation (Multi) 04/27/2023    s/p A-fib ablation at [facility] 2008   Follows with Dr. James Ramicone  Not on AC d/t his choice   Has occasional break-through episodes       Thyroiditis     yearly ultrasound - no present medication    Ulcerative colitis     managed for last 50 years on Delzical      Past Surgical History:  He has a past surgical history that includes Hemorrhoid surgery; Ulnar nerve transposition (Right); and Ablation of dysrhythmic focus.      Social History:  He reports that he has never smoked. He has never been exposed to tobacco smoke. He has never used smokeless tobacco. He reports that he does not currently use alcohol. He reports that he does not use " drugs.    Family History:  Family History   Problem Relation Name Age of Onset    Transient ischemic attack Mother      Heart attack Father      Breast cancer Sister      Thyroid disease Sister      No Known Problems Brother       Allergies:  Bee venom protein (honey bee)    Outpatient Medications:  Current Outpatient Medications   Medication Instructions    ascorbic acid (VITAMIN C) 500 mg, Daily    aspirin 81 mg EC tablet 1 tablet, Daily    cholecalciferol (VITAMIN D3) 25 mcg, Daily    DelzicoL 400 mg DR capsule     docosahexaenoic acid/epa (FISH OIL ORAL) 1 tablet, Daily    EPINEPHrine (EPIPEN) 0.3 mg    milk thistle 175 mg, Daily    multivitamin tablet 1 tablet, Daily    predniSONE (DELTASONE) 5 mg, As needed    zinc gluconate 50 mg tablet Daily     Last Recorded Vitals:  There were no vitals filed for this visit.    Physical Exam  Patient is alert and oriented x3.  HEENT is unremarkable mucous members are moist  Neck no JVP no bruits upstrokes are full no thyromegaly  Lungs are clear bilaterally.  No wheezing crackles or rales  Heart regular rhythm normal S1-S2 there is no S3 soft ZAINAB, soft early DM  Abdomen is soft bs are positive nontender nondistended no organomegaly no pulsatile masses  Extremities have no edema.  Distal pulses present palpable.  Neuro is grossly nonfocal  Skin has no rashes     Last Labs:  CBC -  Lab Results   Component Value Date    WBC 5.8 05/22/2024    HGB 15.1 05/22/2024    HCT 43.8 05/22/2024    MCV 95 05/22/2024     05/22/2024     CMP -  Lab Results   Component Value Date    CALCIUM 9.1 09/26/2024    PROT 6.1 (L) 09/26/2024    ALBUMIN 4.3 09/26/2024    AST 17 09/26/2024    ALT 15 09/26/2024    ALKPHOS 69 09/26/2024    BILITOT 0.6 09/26/2024     LIPID PANEL -   Lab Results   Component Value Date    CHOL 244 (H) 03/08/2024    HDL 49.9 03/08/2024    CHHDL 4.9 03/08/2024    VLDL 22 03/08/2024    TRIG 112 03/08/2024    NHDL 194 (H) 03/08/2024     RENAL FUNCTION PANEL -   Lab  Results   Component Value Date    K 4.4 09/26/2024     Lab Results   Component Value Date    HGBA1C 5.2 03/08/2024        Procedure     CAC 1/14/2025  Zero    TST 10/29/2024 nondiagnostic chronotropic incompetence excellent functional capacity for age normal perfusion EF 52%    Holter 10/15/2024 sinus 41, 71, 58 bpm    Echo 8/15/2024 EF 60%, LAE, moderate AI, TAA 3.9 cm    ECHO [08/16/2023]: Est EF 60%. Mod AR.     ECHO [09/13/2022]: LVSF normal, EF 60-65%. Mod AR.     ECHO [10/05/2021]: EF 55-60%. AV has excellent excursion. The elevated gradient is related to severity of AI. Mod-sev AR.     HOLTER [06/09/2020 â€“ 06/12/2020]: SR, -53. No ep/of A-fib. Short runs atrial tachycardia present, longest 12 beats. No ep/of high-grade AV block. No ventricular tachycardia.     CT / SCORING [05/15/2020] = 0 . . . Mild patchy bibasilar infiltrates and/or atelectasis. Mildly prominent nonspecific mediastinal nodes.      EX NST (09/18/2018): exercised 10.22 Normal well-maint LVF 53% EF     Pulmonary Vein Isolation [01/28/2008 @ [facility]]     CARDIOVERSION (08/02/2007) = Successful     Assessment/Plan   1. Atrial fibrillation. That is post PVI [facility] 2008. Chads vascular score is 1 (AGE).  His Kardia mobile broke.  He got a new one but has not been using it yet.      2. Aortic regurgitation.  His echo 8/15/2024 reveals moderate aortic regurgitation with a TAA 3.9 cm.  This is stable.    3. I shortness of breath.  This remains an issue.  On his EKG at baseline he had a junctional bradycardia.  On his Holter monitor his highest heart rate was 72 bpm.  His average was 58 bpm.  On his exercise nuclear stress test he had excellent functional capacity for age exercising for 11 minutes and 48 seconds.  However he was only able to achieve a heart rate of 118 bpm which was 77% of his maximal predicted heart rate.  I question if this chronotropic incompetence is leading to his inability to exert himself more  vigorously.  I will refer him back to electrophysiology for consideration for pacemaker to enhance his lifestyle.    4.  Hyperlipidemia.  3/8/2024  HDL 50 triglycerides 112.  His LDL seems to go up in March with an comes down in October.  He needs to get his diet in order.  His LDL is too high.  Dietary modification is necessary.    5.  Junctional bradycardia.  As noted above, he does have evidence of sick sinus syndrome with chronotropic incompetence which may be leading to symptoms.  He will be referred to electrophysiology.    I reviewed the results of his echo, stress test, and Holter monitor with him.  He will be referred to electrophysiology who he has seen in the past.    Echo 10/2025, RTC 10/2025 after echo  Karthik Santa MD     Instructions and follow up

## 2025-02-19 ENCOUNTER — APPOINTMENT (OUTPATIENT)
Dept: CARDIOLOGY | Facility: CLINIC | Age: 65
End: 2025-02-19
Payer: COMMERCIAL

## 2025-02-19 VITALS
OXYGEN SATURATION: 97 % | BODY MASS INDEX: 25.11 KG/M2 | WEIGHT: 175 LBS | DIASTOLIC BLOOD PRESSURE: 76 MMHG | HEART RATE: 56 BPM | SYSTOLIC BLOOD PRESSURE: 124 MMHG

## 2025-02-19 DIAGNOSIS — I48.0 PAROXYSMAL ATRIAL FIBRILLATION (MULTI): Chronic | ICD-10-CM

## 2025-02-19 DIAGNOSIS — E78.2 MIXED HYPERLIPIDEMIA: Chronic | ICD-10-CM

## 2025-02-19 DIAGNOSIS — R00.1 BRADYCARDIA, SINUS: ICD-10-CM

## 2025-02-19 DIAGNOSIS — R00.1 JUNCTIONAL BRADYCARDIA: ICD-10-CM

## 2025-02-19 DIAGNOSIS — I35.1 NONRHEUMATIC AORTIC VALVE INSUFFICIENCY: Primary | Chronic | ICD-10-CM

## 2025-02-19 DIAGNOSIS — R06.02 SHORTNESS OF BREATH: ICD-10-CM

## 2025-02-19 PROCEDURE — 1159F MED LIST DOCD IN RCRD: CPT | Performed by: INTERNAL MEDICINE

## 2025-02-19 PROCEDURE — 99214 OFFICE O/P EST MOD 30 MIN: CPT | Performed by: INTERNAL MEDICINE

## 2025-02-19 PROCEDURE — 3074F SYST BP LT 130 MM HG: CPT | Performed by: INTERNAL MEDICINE

## 2025-02-19 PROCEDURE — 3078F DIAST BP <80 MM HG: CPT | Performed by: INTERNAL MEDICINE

## 2025-02-19 PROCEDURE — 1036F TOBACCO NON-USER: CPT | Performed by: INTERNAL MEDICINE

## 2025-02-19 PROCEDURE — 1160F RVW MEDS BY RX/DR IN RCRD: CPT | Performed by: INTERNAL MEDICINE

## 2025-02-19 RX ORDER — MESALAMINE 1.2 G/1
TABLET, DELAYED RELEASE ORAL
COMMUNITY
Start: 2025-02-04

## 2025-02-19 NOTE — PATIENT INSTRUCTIONS
1. Atrial fibrillation. That is post PVI [facility] 2008. Chads vascular score is 1 (AGE).  His Kardia mobile broke.  He got a new one but has not been using it yet.      2. Aortic regurgitation.  His echo 8/15/2024 reveals moderate aortic regurgitation with a TAA 3.9 cm.  This is stable.    3. I shortness of breath.  This remains an issue.  On his EKG at baseline he had a junctional bradycardia.  On his Holter monitor his highest heart rate was 72 bpm.  His average was 58 bpm.  On his exercise nuclear stress test he had excellent functional capacity for age exercising for 11 minutes and 48 seconds.  However he was only able to achieve a heart rate of 118 bpm which was 77% of his maximal predicted heart rate.  I question if this chronotropic incompetence is leading to his inability to exert himself more vigorously.  I will refer him back to electrophysiology for consideration for pacemaker to enhance his lifestyle.    4.  Hyperlipidemia.  3/8/2024  HDL 50 triglycerides 112.  His LDL seems to go up in March with an comes down in October.  He needs to get his diet in order.  His LDL is too high.  Dietary modification is necessary.    5.  Junctional bradycardia.  As noted above, he does have evidence of sick sinus syndrome with chronotropic incompetence which may be leading to symptoms.  He will be referred to electrophysiology.    I reviewed the results of his echo, stress test, and Holter monitor with him.  He will be referred to electrophysiology who he has seen in the past.    Echo 10/2025, RTC 10/2025 after echo

## 2025-03-06 ENCOUNTER — HOSPITAL ENCOUNTER (OUTPATIENT)
Dept: RADIOLOGY | Facility: CLINIC | Age: 65
Discharge: HOME | End: 2025-03-06
Payer: MEDICARE

## 2025-03-06 ENCOUNTER — OFFICE VISIT (OUTPATIENT)
Dept: ORTHOPEDIC SURGERY | Facility: CLINIC | Age: 65
End: 2025-03-06
Payer: MEDICARE

## 2025-03-06 DIAGNOSIS — M25.521 RIGHT ELBOW PAIN: ICD-10-CM

## 2025-03-06 DIAGNOSIS — M77.11 LATERAL EPICONDYLITIS OF RIGHT ELBOW: Primary | ICD-10-CM

## 2025-03-06 PROCEDURE — 73070 X-RAY EXAM OF ELBOW: CPT | Mod: RT

## 2025-03-06 RX ORDER — METHYLPREDNISOLONE 4 MG/1
TABLET ORAL
Qty: 21 TABLET | Refills: 0 | OUTPATIENT
Start: 2025-03-06

## 2025-03-06 NOTE — PROGRESS NOTES
"Chief Complaint   Chief Complaint   Patient presents with    Right Elbow - Pain         HPI:      Cali Jones is a pleasant 64 y.o. year-old male who is seen today for several week history of right elbow pain right-hand-dominant no particular injury extends down his arm when he  objects no numbness or tingling      Review of Systems all other body systems have been reviewed and are negative for complaint.  See intake sheet which was reviewed and scanned into the chart      There were no vitals filed for this visit.    Past Medical History:   Diagnosis Date    A-fib (Multi)     low dose asa, no issues in 13 years    Agatston coronary artery calcium score less than 100 01/28/2025 1/14/2025 = 0      Aortic valve insufficiency 04/27/2023    Aortic valve regurgitation     moderate, last echo 8/16/23    Arthritis     chronic pain past 20 years - prednisone prn    BPH (benign prostatic hyperplasia)     Diabetes mellitus, type 2 (Multi) 10/03/2023    Fibromyalgia     chronic pain\"all over\" takes prednisone as needed    GERD (gastroesophageal reflux disease)     under control    Hyperlipidemia 04/27/2023    Dr. Bowman follows ... CAC = 0       Migraines     resolved per patient    Noninfective gastroenteritis and colitis, unspecified     Colitis    DICK (obstructive sleep apnea)     had boderline sleep study, no CPAP    Paroxysmal atrial fibrillation (Multi) 04/27/2023    s/p A-fib ablation at [facility] 2008   Follows with Dr. James Ramicone  Not on AC d/t his choice   Has occasional break-through episodes       Thyroiditis     yearly ultrasound - no present medication    Ulcerative colitis     managed for last 50 years on Delzical     Patient Active Problem List   Diagnosis    BPH (benign prostatic hyperplasia)    Junctional bradycardia    Paroxysmal atrial fibrillation (Multi)    Fatigue    Fibromyalgia    Hyperlipidemia    Hyperthyroidism    Thyroiditis    Inflammatory arthritis    Laryngopharyngeal reflux    Aortic " valve insufficiency    Neuropathic pain    DICK (obstructive sleep apnea)    Subcutaneous mass of left lower extremity    Vitamin D deficiency    Vitamin B 12 deficiency    Age related osteoporosis    Secondary hyperparathyroidism, not elsewhere classified    Polyneuropathy    Diabetes mellitus, type 2 (Multi)       Medication Documentation Review Audit       Reviewed by Hu Godinez MA (Medical Assistant) on 03/06/25 at 1321      Medication Order Taking? Sig Documenting Provider Last Dose Status   ascorbic acid (Vitamin C) 500 mg tablet [id number] No Take 1 tablet (500 mg) by mouth once daily. Historical MD Jennifer Not Taking Active   cholecalciferol (Vitamin D3) 25 MCG (1000 UT) capsule [id number] No Take 1 capsule (25 mcg) by mouth once daily. Historical Provider, MD Taking Active   docosahexaenoic acid/epa (FISH OIL ORAL) [id number] No Take 1 tablet by mouth once daily. Historical Provider, MD Taking Active   EPINEPHrine 0.3 mg/0.3 mL injection syringe [id number] No 0.3 mL (0.3 mg). As Directed Historical Provider, MD Taking Active   mesalamine (Lialda) 1.2 gram EC tablet [id number]  TAKE FOUR (4) TABLETS BY MOUTH EVERY DAY WITH FOOD. DO NOT CUT TABLET Historical Provider, MD  Active   milk thistle 175 mg tablet [id number] No Take 1 tablet (175 mg) by mouth once daily. Historical Provider, MD Taking Active   multivitamin tablet [id number] No Take 1 tablet by mouth once daily. Historical Provider, MD Taking Active   predniSONE (Deltasone) 5 mg tablet [id number] No Take 1 tablet (5 mg) by mouth if needed. Historical Provider, MD Not Taking Active   zinc gluconate 50 mg tablet [id number] No Take by mouth once daily. Historical Provider, MD Not Taking Active                    Allergies   Allergen Reactions    Bee Venom Protein (Honey Bee) Unknown       Social History     Socioeconomic History    Marital status: Single     Spouse name: Not on file    Number of children: Not on file    Years of education: Not on file     Highest education level: Not on file   Occupational History    Not on file   Tobacco Use    Smoking status: Never     Passive exposure: Never    Smokeless tobacco: Never   Vaping Use    Vaping status: Never Used   Substance and Sexual Activity    Alcohol use: Not Currently    Drug use: Never    Sexual activity: Defer   Other Topics Concern    Not on file   Social History Narrative    Not on file     Social Drivers of Health     Financial Resource Strain: Not on file   Food Insecurity: Not on file   Transportation Needs: Not on file   Physical Activity: Not on file   Stress: Not on file   Social Connections: Not on file   Intimate Partner Violence: Not on file   Housing Stability: Not on file       Past Surgical History:   Procedure Laterality Date    ABLATION OF DYSRHYTHMIC FOCUS      HEMORRHOID SURGERY      ULNAR NERVE TRANSPOSITION Right        There is no height or weight on file to calculate BMI.    HgA1c:   Lab Results   Component Value Date    HGBA1C 5.2 03/08/2024    [id number] 103 03/08/2024       Physical Exam:  Constitutional: Well-developed well-nourished   Eyes: Sclerae anicteric, pupils equal and round  HENT: Normocephalic atraumatic  Cardiovascular: Pulses full, regular rate and rhythm  Respiratory: Breathing not labored, no wheezing  Integumentary: Skin intact, no lesions or rashes  Neurological: Sensation intact, no gross strength deficits, reflexes equal  Psychiatric: Alert oriented and appropriate  Hematologic/lymphatic: No lymphadenopathy    Right elbow: No mass or erythema full range of motion tender lateral epicondyle no defect severe pain with resisted wrist extension          Imaging:  X-ray of the elbow negative        Impression/Plan:  Tennis elbow  Strep Medrol Dosepak therapy referral May need further imaging follow-up 4 to 6 weeks as needed

## 2025-03-07 ENCOUNTER — TELEPHONE (OUTPATIENT)
Dept: ORTHOPEDIC SURGERY | Facility: CLINIC | Age: 65
End: 2025-03-07
Payer: MEDICARE

## 2025-03-07 DIAGNOSIS — M77.11 LATERAL EPICONDYLITIS OF RIGHT ELBOW: Primary | ICD-10-CM

## 2025-03-07 RX ORDER — METHYLPREDNISOLONE 4 MG/1
TABLET ORAL
Qty: 21 TABLET | Refills: 0 | Status: SHIPPED | OUTPATIENT
Start: 2025-03-07

## 2025-03-07 NOTE — TELEPHONE ENCOUNTER
Patient stated that a Medrol Dosepak was supposed to be prescribed for him. The patient was seen in the office on 03/06/2025. Can we please send this in for the patient?

## 2025-03-12 ENCOUNTER — APPOINTMENT (OUTPATIENT)
Dept: PRIMARY CARE | Facility: CLINIC | Age: 65
End: 2025-03-12
Payer: MEDICARE

## 2025-03-12 VITALS
OXYGEN SATURATION: 97 % | BODY MASS INDEX: 24.91 KG/M2 | HEIGHT: 70 IN | HEART RATE: 46 BPM | SYSTOLIC BLOOD PRESSURE: 130 MMHG | WEIGHT: 174 LBS | DIASTOLIC BLOOD PRESSURE: 62 MMHG | TEMPERATURE: 97.7 F

## 2025-03-12 DIAGNOSIS — E55.9 VITAMIN D DEFICIENCY: ICD-10-CM

## 2025-03-12 DIAGNOSIS — R26.89 BALANCE DISORDER: ICD-10-CM

## 2025-03-12 DIAGNOSIS — Z12.5 SCREENING FOR PROSTATE CANCER: ICD-10-CM

## 2025-03-12 DIAGNOSIS — E78.2 MIXED HYPERLIPIDEMIA: Chronic | ICD-10-CM

## 2025-03-12 DIAGNOSIS — E11.9 TYPE 2 DIABETES MELLITUS WITHOUT COMPLICATION, WITHOUT LONG-TERM CURRENT USE OF INSULIN (MULTI): Chronic | ICD-10-CM

## 2025-03-12 DIAGNOSIS — I48.0 PAROXYSMAL ATRIAL FIBRILLATION (MULTI): Chronic | ICD-10-CM

## 2025-03-12 DIAGNOSIS — E53.8 VITAMIN B 12 DEFICIENCY: ICD-10-CM

## 2025-03-12 DIAGNOSIS — Z00.00 ROUTINE GENERAL MEDICAL EXAMINATION AT HEALTH CARE FACILITY: Primary | ICD-10-CM

## 2025-03-12 LAB — PSA SERPL-MCNC: 1.08 NG/ML

## 2025-03-12 PROCEDURE — 1159F MED LIST DOCD IN RCRD: CPT | Performed by: FAMILY MEDICINE

## 2025-03-12 PROCEDURE — 3075F SYST BP GE 130 - 139MM HG: CPT | Performed by: FAMILY MEDICINE

## 2025-03-12 PROCEDURE — 99214 OFFICE O/P EST MOD 30 MIN: CPT | Performed by: FAMILY MEDICINE

## 2025-03-12 PROCEDURE — 3008F BODY MASS INDEX DOCD: CPT | Performed by: FAMILY MEDICINE

## 2025-03-12 PROCEDURE — 1160F RVW MEDS BY RX/DR IN RCRD: CPT | Performed by: FAMILY MEDICINE

## 2025-03-12 PROCEDURE — G0438 PPPS, INITIAL VISIT: HCPCS | Performed by: FAMILY MEDICINE

## 2025-03-12 PROCEDURE — G0446 INTENS BEHAVE THER CARDIO DX: HCPCS | Performed by: FAMILY MEDICINE

## 2025-03-12 PROCEDURE — G0442 ANNUAL ALCOHOL SCREEN 15 MIN: HCPCS | Performed by: FAMILY MEDICINE

## 2025-03-12 PROCEDURE — 3078F DIAST BP <80 MM HG: CPT | Performed by: FAMILY MEDICINE

## 2025-03-12 PROCEDURE — 1036F TOBACCO NON-USER: CPT | Performed by: FAMILY MEDICINE

## 2025-03-12 PROCEDURE — 99497 ADVNCD CARE PLAN 30 MIN: CPT | Performed by: FAMILY MEDICINE

## 2025-03-12 PROCEDURE — 1126F AMNT PAIN NOTED NONE PRSNT: CPT | Performed by: FAMILY MEDICINE

## 2025-03-12 PROCEDURE — 1170F FXNL STATUS ASSESSED: CPT | Performed by: FAMILY MEDICINE

## 2025-03-12 ASSESSMENT — ENCOUNTER SYMPTOMS
DEPRESSION: 0
OCCASIONAL FEELINGS OF UNSTEADINESS: 0
LOSS OF SENSATION IN FEET: 0

## 2025-03-12 ASSESSMENT — PAIN SCALES - GENERAL: PAINLEVEL_OUTOF10: 0-NO PAIN

## 2025-03-12 ASSESSMENT — ACTIVITIES OF DAILY LIVING (ADL)
GROCERY_SHOPPING: INDEPENDENT
DOING_HOUSEWORK: INDEPENDENT
TAKING_MEDICATION: INDEPENDENT
BATHING: INDEPENDENT
MANAGING_FINANCES: INDEPENDENT
DRESSING: INDEPENDENT

## 2025-03-12 ASSESSMENT — PATIENT HEALTH QUESTIONNAIRE - PHQ9
2. FEELING DOWN, DEPRESSED OR HOPELESS: NOT AT ALL
1. LITTLE INTEREST OR PLEASURE IN DOING THINGS: NOT AT ALL
SUM OF ALL RESPONSES TO PHQ9 QUESTIONS 1 AND 2: 0
SUM OF ALL RESPONSES TO PHQ9 QUESTIONS 1 AND 2: 0
2. FEELING DOWN, DEPRESSED OR HOPELESS: NOT AT ALL
1. LITTLE INTEREST OR PLEASURE IN DOING THINGS: NOT AT ALL

## 2025-03-12 NOTE — PROGRESS NOTES
"Subjective   Reason for Visit: Cali Jones is an 64 y.o. male here for a Medicare Wellness visit.     Past Medical, Surgical, and Family History reviewed and updated in chart.    Reviewed all medications by prescribing practitioner or clinical pharmacist (such as prescriptions, OTCs, herbal therapies and supplements) and documented in the medical record.    HPI  64-year-old male presents for Medicare wellness visit.  Still having issues with balance.  Patient Care Team:  Flo CARRINGTON DO as PCP - General  Flo CARRINGTON DO as PCP - [facility] Attributed Provider  Karthik Santa MD as Consulting Physician (Cardiology)     Review of Systems  Constitutional: no chills, no fever and no night sweats.   Eyes: no blurred vision and no eyesight problems.   ENT: no hearing loss, no nasal congestion, no nasal discharge, no hoarseness and no sore throat.   Cardiovascular: no chest pain, no intermittent leg claudication, no lower extremity edema, no palpitations and no syncope.   Respiratory: no cough, no shortness of breath during exertion, no shortness of breath at rest and no wheezing.   Gastrointestinal: no abdominal pain, no blood in stools, no constipation, no diarrhea, no melena, no nausea, no rectal pain and no vomiting.   Genitourinary: no dysuria, no change in urinary frequency, no urinary hesitancy and no feelings of urinary urgency.   Neurological: no difficulty walking, no headache, no limb weakness, no numbness and no tingling.   Psychiatric: no anxiety, no depression, no anhedonia and no substance use disorders.   Endocrine: no recent weight gain and no recent weight loss.   Hematologic/Lymphatic: no tendency for easy bruising and no swollen glands.  Objective   Vitals:  /62 (BP Location: Left arm, Patient Position: Sitting, BP Cuff Size: Adult)   Pulse (!) 46   Temp 36.5 °C (97.7 °F) (Temporal)   Ht 1.778 m (5' 10\")   Wt 78.9 kg (174 lb)   SpO2 97%   BMI 24.97 kg/m²       Physical " Exam  Constitutional: Alert and in no acute distress. Well developed, well nourished.   Eyes: Pupils were equal in size, round, reactive to light (PERRL) with normal accommodation and extraocular movements intact (EOMI). Ophthalmoscopic examination: Normal.   Ears, Nose, Mouth, and Throat: External inspection of ears and nose: Normal. Otoscopic examination: Normal. Lips, teeth, and gums: Normal. Oropharynx: Normal.   Neck: No neck mass was observed. Supple. Thyroid not enlarged and there were no palpable thyroid nodules.   Cardiovascular: Heart rate and rhythm were normal, normal S1 and S2, no gallops, no murmurs and no pericardial rub. Carotid pulses: Normal with no bruits. Abdominal aorta: Normal. Pedal pulses: Normal. No peripheral edema.   Pulmonary: No respiratory distress. Clear bilateral breath sounds.   Abdomen: Soft nontender; no abdominal mass palpated. Normal bowel sounds. No organomegaly.   Skin: Normal skin color and pigmentation, normal skin turgor, and no rash.   Psychiatric: Judgment and insight: Intact. Mood and affect: Normal.  Assessment & Plan  Routine general medical examination at health care facility    Orders:    1 Year Follow Up In Primary Care - Wellness Exam; Future    Screening for prostate cancer    Orders:    PSA screen; Future    Type 2 diabetes mellitus without complication, without long-term current use of insulin (Multi)    Orders:    CBC and Auto Differential; Future    Urinalysis with Reflex Microscopic; Future    Hemoglobin A1c; Future    Albumin-Creatinine Ratio, Urine Random; Future    Vitamin D deficiency    Orders:    Vitamin D 25-Hydroxy,Total (for eval of Vitamin D levels); Future    Vitamin B 12 deficiency    Orders:    Vitamin B12; Future    Paroxysmal atrial fibrillation (Multi)         Balance disorder    Orders:    Referral to Physical Therapy; Future    Mixed hyperlipidemia    Orders:    Lipid Panel; Future    Comprehensive Metabolic Panel; Future           Advance  Directives Discussion  16 - 20 minutes were spent discussing Advanced Care Planning (including a Living Will, Medical Power Of , as well as specific end of life choices and/or directives). The details of that discussion were documented in Advanced Directives Discussion section of the medical record.     Alcohol Misuse Screen  5 - 10 minutes were spent screening the patient for alcohol misuse disorder.    Cardiac Risk Assessment  15 - 20 minutes were spent discussing Cardiovascular risk and, if needed, lifestyle modifications were recommended, including nutritional choices, exercise, and elimination of habits contributing to risk.   Aspirin use/disuse was discussed following the guidelines below:  low dose ASA ( mg) should be considered:    If prior Heart Attack/Stroke/Peripheral vascular disease:  Generally recommend daily low dose aspirin unless extremely high bleeding risk (e.g., gastrointestinal).    If no prior Heart Attack/Stroke/Peripheral vascular disease:              Age over 70: Do not use Aspirin for prevention    Age less than 70 and 10-year cardiovascular disease risk is >20%: use low dose Aspirin for prevention.

## 2025-03-12 NOTE — ASSESSMENT & PLAN NOTE
Orders:    CBC and Auto Differential; Future    Urinalysis with Reflex Microscopic; Future    Hemoglobin A1c; Future    Albumin-Creatinine Ratio, Urine Random; Future

## 2025-03-14 LAB
25(OH)D3+25(OH)D2 SERPL-MCNC: 49 NG/ML (ref 30–100)
ALBUMIN SERPL-MCNC: 4.6 G/DL (ref 3.6–5.1)
ALBUMIN/CREAT UR: NORMAL MG/G CREAT
ALP SERPL-CCNC: 67 U/L (ref 35–144)
ALT SERPL-CCNC: 19 U/L (ref 9–46)
ANION GAP SERPL CALCULATED.4IONS-SCNC: 11 MMOL/L (CALC) (ref 7–17)
APPEARANCE UR: CLEAR
AST SERPL-CCNC: 18 U/L (ref 10–35)
BASOPHILS # BLD AUTO: 34 CELLS/UL (ref 0–200)
BASOPHILS NFR BLD AUTO: 0.5 %
BILIRUB SERPL-MCNC: 0.7 MG/DL (ref 0.2–1.2)
BILIRUB UR QL STRIP: NEGATIVE
BUN SERPL-MCNC: 28 MG/DL (ref 7–25)
CALCIUM SERPL-MCNC: 9.2 MG/DL (ref 8.6–10.3)
CHLORIDE SERPL-SCNC: 106 MMOL/L (ref 98–110)
CHOLEST SERPL-MCNC: 288 MG/DL
CHOLEST/HDLC SERPL: 5.1 (CALC)
CO2 SERPL-SCNC: 23 MMOL/L (ref 20–32)
COLOR UR: YELLOW
CREAT SERPL-MCNC: 0.85 MG/DL (ref 0.7–1.35)
CREAT UR-MCNC: 128 MG/DL (ref 20–320)
EGFRCR SERPLBLD CKD-EPI 2021: 96 ML/MIN/1.73M2
EOSINOPHIL # BLD AUTO: 80 CELLS/UL (ref 15–500)
EOSINOPHIL NFR BLD AUTO: 1.2 %
ERYTHROCYTE [DISTWIDTH] IN BLOOD BY AUTOMATED COUNT: 12.5 % (ref 11–15)
EST. AVERAGE GLUCOSE BLD GHB EST-MCNC: 108 MG/DL
EST. AVERAGE GLUCOSE BLD GHB EST-SCNC: 6 MMOL/L
GLUCOSE SERPL-MCNC: 85 MG/DL (ref 65–99)
GLUCOSE UR QL STRIP: NEGATIVE
HBA1C MFR BLD: 5.4 % OF TOTAL HGB
HCT VFR BLD AUTO: 47.7 % (ref 38.5–50)
HDLC SERPL-MCNC: 56 MG/DL
HGB BLD-MCNC: 15.9 G/DL (ref 13.2–17.1)
HGB UR QL STRIP: NEGATIVE
KETONES UR QL STRIP: NEGATIVE
LDLC SERPL CALC-MCNC: 200 MG/DL (CALC)
LEUKOCYTE ESTERASE UR QL STRIP: NEGATIVE
LYMPHOCYTES # BLD AUTO: 2271 CELLS/UL (ref 850–3900)
LYMPHOCYTES NFR BLD AUTO: 33.9 %
MCH RBC QN AUTO: 32.6 PG (ref 27–33)
MCHC RBC AUTO-ENTMCNC: 33.3 G/DL (ref 32–36)
MCV RBC AUTO: 97.7 FL (ref 80–100)
MICROALBUMIN UR-MCNC: <0.2 MG/DL
MONOCYTES # BLD AUTO: 596 CELLS/UL (ref 200–950)
MONOCYTES NFR BLD AUTO: 8.9 %
NEUTROPHILS # BLD AUTO: 3719 CELLS/UL (ref 1500–7800)
NEUTROPHILS NFR BLD AUTO: 55.5 %
NITRITE UR QL STRIP: NEGATIVE
NONHDLC SERPL-MCNC: 232 MG/DL (CALC)
PH UR STRIP: 5.5 [PH] (ref 5–8)
PLATELET # BLD AUTO: 202 THOUSAND/UL (ref 140–400)
PMV BLD REES-ECKER: 12 FL (ref 7.5–12.5)
POTASSIUM SERPL-SCNC: 4.4 MMOL/L (ref 3.5–5.3)
PROT SERPL-MCNC: 6.7 G/DL (ref 6.1–8.1)
PROT UR QL STRIP: NEGATIVE
RBC # BLD AUTO: 4.88 MILLION/UL (ref 4.2–5.8)
SODIUM SERPL-SCNC: 140 MMOL/L (ref 135–146)
SP GR UR STRIP: 1.02 (ref 1–1.03)
TRIGL SERPL-MCNC: 158 MG/DL
VIT B12 SERPL-MCNC: 542 PG/ML (ref 200–1100)
WBC # BLD AUTO: 6.7 THOUSAND/UL (ref 3.8–10.8)

## 2025-03-16 ENCOUNTER — OFFICE VISIT (OUTPATIENT)
Dept: URGENT CARE | Age: 65
End: 2025-03-16
Payer: MEDICARE

## 2025-03-16 VITALS
SYSTOLIC BLOOD PRESSURE: 121 MMHG | RESPIRATION RATE: 16 BRPM | TEMPERATURE: 99.2 F | DIASTOLIC BLOOD PRESSURE: 59 MMHG | HEART RATE: 63 BPM | OXYGEN SATURATION: 95 %

## 2025-03-16 DIAGNOSIS — J06.9 UPPER RESPIRATORY TRACT INFECTION, UNSPECIFIED TYPE: ICD-10-CM

## 2025-03-16 DIAGNOSIS — J10.1 INFLUENZA A: Primary | ICD-10-CM

## 2025-03-16 LAB
POC RAPID INFLUENZA A: POSITIVE
POC RAPID INFLUENZA B: NEGATIVE
POC SARS-COV-2 AG BINAX: NORMAL

## 2025-03-16 PROCEDURE — 3078F DIAST BP <80 MM HG: CPT | Performed by: STUDENT IN AN ORGANIZED HEALTH CARE EDUCATION/TRAINING PROGRAM

## 2025-03-16 PROCEDURE — 87804 INFLUENZA ASSAY W/OPTIC: CPT | Performed by: STUDENT IN AN ORGANIZED HEALTH CARE EDUCATION/TRAINING PROGRAM

## 2025-03-16 PROCEDURE — 1159F MED LIST DOCD IN RCRD: CPT | Performed by: STUDENT IN AN ORGANIZED HEALTH CARE EDUCATION/TRAINING PROGRAM

## 2025-03-16 PROCEDURE — 87811 SARS-COV-2 COVID19 W/OPTIC: CPT | Performed by: STUDENT IN AN ORGANIZED HEALTH CARE EDUCATION/TRAINING PROGRAM

## 2025-03-16 PROCEDURE — 99213 OFFICE O/P EST LOW 20 MIN: CPT | Performed by: STUDENT IN AN ORGANIZED HEALTH CARE EDUCATION/TRAINING PROGRAM

## 2025-03-16 PROCEDURE — 3074F SYST BP LT 130 MM HG: CPT | Performed by: STUDENT IN AN ORGANIZED HEALTH CARE EDUCATION/TRAINING PROGRAM

## 2025-03-16 RX ORDER — METHYLPREDNISOLONE 4 MG/1
TABLET ORAL
Qty: 21 TABLET | Refills: 0 | Status: SHIPPED | OUTPATIENT
Start: 2025-03-16 | End: 2025-03-22

## 2025-03-16 RX ORDER — BENZONATATE 200 MG/1
200 CAPSULE ORAL 3 TIMES DAILY PRN
Qty: 42 CAPSULE | Refills: 0 | Status: SHIPPED | OUTPATIENT
Start: 2025-03-16 | End: 2025-04-15

## 2025-03-16 RX ORDER — AZITHROMYCIN 250 MG/1
TABLET, FILM COATED ORAL
Qty: 6 TABLET | Refills: 0 | Status: SHIPPED | OUTPATIENT
Start: 2025-03-16 | End: 2025-03-21

## 2025-03-16 ASSESSMENT — ENCOUNTER SYMPTOMS
CHILLS: 1
RHINORRHEA: 1
FATIGUE: 1
MYALGIAS: 1
SORE THROAT: 1
HEADACHES: 1
CARDIOVASCULAR NEGATIVE: 1
FEVER: 1
GASTROINTESTINAL NEGATIVE: 1
COUGH: 1

## 2025-03-16 NOTE — PROGRESS NOTES
"Subjective   Patient ID: Cali Jones is a 64 y.o. male. They present today with a chief complaint of Sore Throat, Cough, URI, Fever, Headache, Nasal Congestion, and Generalized Body Aches (Sick X 4 days. TD-MA).    History of Present Illness    Sore Throat   Associated symptoms include congestion, coughing and headaches.   Cough  Associated symptoms include chills, a fever, headaches, myalgias, rhinorrhea and a sore throat.   URI  Presenting symptoms: congestion, cough, fatigue, fever, rhinorrhea and sore throat    Associated symptoms: headaches and myalgias    Fever   Associated symptoms include congestion, coughing, headaches and a sore throat.   Headache  Associated symptoms: congestion, cough, fatigue, fever, myalgias, sore throat and URI      Patient presents to urgent care for a chief complaint of sore throat fever body aches semiproductive cough congestion and fatigue over the last 4 days no report of respiratory distress no report of vomiting or diarrhea  Past Medical History  Allergies as of 03/16/2025 - Reviewed 03/16/2025   Allergen Reaction Noted    Bee venom protein (honey bee) Unknown 04/27/2023       (Not in a hospital admission)       Past Medical History:   Diagnosis Date    A-fib (Multi)     low dose asa, no issues in 13 years    Agatston coronary artery calcium score less than 100 01/28/2025 1/14/2025 = 0      Aortic valve insufficiency 04/27/2023    Aortic valve regurgitation     moderate, last echo 8/16/23    Arthritis     chronic pain past 20 years - prednisone prn    BPH (benign prostatic hyperplasia)     Diabetes mellitus, type 2 (Multi) 10/03/2023    Fibromyalgia     chronic pain\"all over\" takes prednisone as needed    GERD (gastroesophageal reflux disease)     under control    Hyperlipidemia 04/27/2023    Dr. Bowman follows ... CAC = 0       Migraines     resolved per patient    Noninfective gastroenteritis and colitis, unspecified     Colitis    DICK (obstructive sleep apnea)     had " boderline sleep study, no CPAP    Paroxysmal atrial fibrillation (Multi) 04/27/2023    s/p A-fib ablation at [facility] 2008   Follows with Dr. James Ramicone  Not on AC d/t his choice   Has occasional break-through episodes       Thyroiditis     yearly ultrasound - no present medication    Ulcerative colitis     managed for last 50 years on Delzical       Past Surgical History:   Procedure Laterality Date    ABLATION OF DYSRHYTHMIC FOCUS      HEMORRHOID SURGERY      ULNAR NERVE TRANSPOSITION Right         reports that he has never smoked. He has never been exposed to tobacco smoke. He has never used smokeless tobacco. He reports that he does not currently use alcohol. He reports that he does not use drugs.    Review of Systems  Review of Systems   Constitutional:  Positive for chills, fatigue and fever.   HENT:  Positive for congestion, rhinorrhea and sore throat.    Respiratory:  Positive for cough.    Cardiovascular: Negative.    Gastrointestinal: Negative.    Musculoskeletal:  Positive for myalgias.   Neurological:  Positive for headaches.                                  Objective    Vitals:    03/16/25 1336   BP: 121/59   Pulse: 63   Resp: 16   Temp: 37.3 °C (99.2 °F)   SpO2: 95%     No LMP for male patient.    Physical Exam  Vitals and nursing note reviewed.   Constitutional:       General: He is not in acute distress.     Appearance: Normal appearance. He is not ill-appearing, toxic-appearing or diaphoretic.   HENT:      Head: Normocephalic and atraumatic.      Right Ear: Tympanic membrane normal. There is no impacted cerumen.      Left Ear: Tympanic membrane normal. There is no impacted cerumen.      Nose: Congestion present.      Mouth/Throat:      Mouth: Mucous membranes are moist.      Pharynx: Posterior oropharyngeal erythema present. No oropharyngeal exudate.   Cardiovascular:      Rate and Rhythm: Normal rate and regular rhythm.      Pulses: Normal pulses.      Heart sounds: Normal heart sounds.   Pulmonary:       Effort: Pulmonary effort is normal. No respiratory distress.      Breath sounds: Normal breath sounds. No stridor. No wheezing, rhonchi or rales.      Comments: 1 episode of cough during exam  Lymphadenopathy:      Cervical: No cervical adenopathy.   Skin:     Findings: No rash.   Neurological:      General: No focal deficit present.      Mental Status: He is alert and oriented to person, place, and time.   Psychiatric:         Mood and Affect: Mood normal.         Behavior: Behavior normal.         Procedures    Point of Care Test & Imaging Results from this visit  Results for orders placed or performed in visit on 03/16/25   POCT Covid-19 Rapid Antigen   Result Value Ref Range    POC GIANCARLO-COV-2 AG  Presumptive negative test for SARS-CoV-2 (no antigen detected)     Presumptive negative test for SARS-CoV-2 (no antigen detected)   POCT Influenza A/B manually resulted   Result Value Ref Range    POC Rapid Influenza A Positive (A) Negative    POC Rapid Influenza B Negative Negative      No results found.    Diagnostic study results (if any) were reviewed by Orestes Candelario PA-C.    Assessment/Plan   Allergies, medications, history, and pertinent labs/EKGs/Imaging reviewed by Orestes Candelario PA-C.     Medical Decision Making  I did discuss with patient positive for influenza A, did miss therapeutic window for Tamiflu, patient will be placed on Medrol Dosepak and Tessalon Perles along with azithromycin due to history of DICK, patient was advised if worsening symptoms or signs of respiratory distress he is to go to the emergency room or call 9 11, if no resolution regression of symptoms in 7 to 10 days may follow-up with primary care return to urgent care for reevaluation.  Patient verbalized understanding agreeable to plan discharge emergent care A+O x 4 stable condition no signs of distress.    Orders and Diagnoses  Diagnoses and all orders for this visit:  Influenza A  -     methylPREDNISolone (Medrol Dospak)  4 mg tablets; Take as directed on package.  -     benzonatate (Tessalon) 200 mg capsule; Take 1 capsule (200 mg) by mouth 3 times a day as needed for cough. Do not crush or chew.  -     azithromycin (Zithromax) 250 mg tablet; Take 2 tabs (500 mg) by mouth today, than 1 daily for 4 days.  Upper respiratory tract infection, unspecified type  -     POCT Covid-19 Rapid Antigen  -     POCT Influenza A/B manually resulted      Medical Admin Record      Patient disposition: Home    Electronically signed by Orestes Candelario PA-C  2:24 PM

## 2025-04-09 ENCOUNTER — APPOINTMENT (OUTPATIENT)
Dept: CARDIOLOGY | Facility: CLINIC | Age: 65
End: 2025-04-09
Payer: MEDICARE

## 2025-04-09 VITALS
OXYGEN SATURATION: 99 % | WEIGHT: 177 LBS | BODY MASS INDEX: 25.34 KG/M2 | HEART RATE: 56 BPM | DIASTOLIC BLOOD PRESSURE: 76 MMHG | HEIGHT: 70 IN | SYSTOLIC BLOOD PRESSURE: 141 MMHG

## 2025-04-09 DIAGNOSIS — I49.5 SSS (SICK SINUS SYNDROME) (MULTI): ICD-10-CM

## 2025-04-09 DIAGNOSIS — R00.1 JUNCTIONAL BRADYCARDIA: Primary | ICD-10-CM

## 2025-04-09 DIAGNOSIS — I48.0 PAROXYSMAL ATRIAL FIBRILLATION (MULTI): Chronic | ICD-10-CM

## 2025-04-09 DIAGNOSIS — I35.1 NONRHEUMATIC AORTIC VALVE INSUFFICIENCY: Chronic | ICD-10-CM

## 2025-04-09 PROCEDURE — 3078F DIAST BP <80 MM HG: CPT | Performed by: INTERNAL MEDICINE

## 2025-04-09 PROCEDURE — 99215 OFFICE O/P EST HI 40 MIN: CPT | Performed by: INTERNAL MEDICINE

## 2025-04-09 PROCEDURE — 1159F MED LIST DOCD IN RCRD: CPT | Performed by: INTERNAL MEDICINE

## 2025-04-09 PROCEDURE — 1036F TOBACCO NON-USER: CPT | Performed by: INTERNAL MEDICINE

## 2025-04-09 PROCEDURE — 3077F SYST BP >= 140 MM HG: CPT | Performed by: INTERNAL MEDICINE

## 2025-04-09 PROCEDURE — 3008F BODY MASS INDEX DOCD: CPT | Performed by: INTERNAL MEDICINE

## 2025-04-09 NOTE — ASSESSMENT & PLAN NOTE
The patient is experiencing symptomatic sinus node dysfunction.  His electrocardiogram shows sinus bradycardia with periods of junctional bradycardia and heart rates in the 40s.  He also has chronotropic incompetence, with difficulty increasing his heart rate during exercise and he has been highly symptomatic.  We discussed the possibility of a pacemaker for treatment of sick sinus syndrome.  Based on his young age and very active lifestyle, I feel that an atrial leadless pacemaker would be the best device for him.  The device implantation procedure and risks were discussed today.  The patient is going to consider this option and then contact me later this year when he is ready to proceed.

## 2025-04-09 NOTE — ASSESSMENT & PLAN NOTE
Cali has a history of atrial fibrillation and had pulmonary vein isolation at [facility] in 2008.  He is not having atrial fibrillation at this time and overall is doing well with regard to the arrhythmia.

## 2025-04-09 NOTE — PROGRESS NOTES
"    History Of Present Illness:      This is a 64-year-old male with a history of atrial fibrillation.  The patient has been maintaining sinus rhythm.  However, he is having problems with symptomatic sinus bradycardia with chronotropic incompetence.  His resting heart rate is frequently below 50 bpm and he has difficulty raising his heart rate during exercise.  During a recent stress test his heart rate only went up as high as 118 bpm.  The maximum heart rate on a stress test was 118 bpm.    Review of Systems  Other review of systems negative  Last Recorded Vitals:      5/29/2024     9:11 AM 10/15/2024     9:14 AM 12/6/2024     3:17 PM 12/11/2024     9:20 AM 2/19/2025     8:53 AM 3/12/2025     8:28 AM 3/16/2025     1:36 PM   Vitals   Systolic 130 112 110  124 130 121   Diastolic 72 68 58  76 62 59   BP Location Right arm Left arm Left arm  Left arm Left arm    Heart Rate 68 58 97  56 46 63   Temp   36.5 °C (97.7 °F)   36.5 °C (97.7 °F) 37.3 °C (99.2 °F)   Resp       16   Height 1.778 m (5' 10\")  1.778 m (5' 10\") 1.778 m (5' 10\")  1.778 m (5' 10\")    Weight (lb) 170 172 175.4 175 175 174    BMI 24.39 kg/m2 24.68 kg/m2 25.17 kg/m2 25.11 kg/m2 25.11 kg/m2 24.97 kg/m2    BSA (m2) 1.95 m2 1.96 m2 1.98 m2 1.98 m2 1.98 m2 1.97 m2    Visit Report Report Report Report Report Report Report Report     Allergies:  Bee venom protein (honey bee)  Outpatient Medications:  Current Outpatient Medications   Medication Instructions    ascorbic acid (VITAMIN C) 500 mg, Daily    benzonatate (TESSALON) 200 mg, oral, 3 times daily PRN, Do not crush or chew.    cholecalciferol (VITAMIN D3) 25 mcg, Daily    docosahexaenoic acid/epa (FISH OIL ORAL) 1 tablet, Daily    EPINEPHrine (EPIPEN) 0.3 mg    mesalamine (Lialda) 1.2 gram EC tablet TAKE FOUR (4) TABLETS BY MOUTH EVERY DAY WITH FOOD. DO NOT CUT TABLET    methylPREDNISolone (Medrol, Marshall,) 4 mg tablets Follow schedule on package instructions    milk thistle 175 mg, Daily    multivitamin " "tablet 1 tablet, Daily    predniSONE (DELTASONE) 5 mg, As needed    zinc gluconate 50 mg tablet Daily       Physical Exam:    General Appearance:  Alert, oriented, no distress  Skin:  Warm and dry  Head and Neck:  No elevation of JVP, no carotid bruits  Cardiac Exam:  Rhythm is regular and bradycardic, S1 and S2 are normal, no murmur S3 or S4  Lungs:  Clear to auscultation  Extremities:  no edema  Neurologic:  No focal deficits  Psychiatric:  Appropriate mood and behavior    Lab Results:    CMP:  Recent Labs     03/12/25  0925 09/26/24  1109 03/08/24  0941 10/03/23  0800 04/03/23  1049 03/01/23  1113 09/27/22  0939 03/22/22  1030    143 144 138 139 143 139 141   K 4.4 4.4 4.3 4.5 4.3 4.3 4.3 4.3    109* 109* 104 106 107 106 106   CO2 23 29 28 27 27 26 25 27   ANIONGAP 11 9* 11 12 10 14 12 12   BUN 28* 24* 22 24* 21 19 22 19   CREATININE 0.85 1.00 0.99 1.05 0.90 1.00 0.86 0.91   EGFR 96 84 85 79  --   --   --   --      Recent Labs     03/12/25 0925 09/26/24  1109 03/08/24  0941 10/03/23  0800 04/03/23  1049   ALBUMIN 4.6 4.3 4.4 3.9 4.2   ALKPHOS 67 69 71 71 65   ALT 19 15 15 21 16   AST 18 17 19 18 19   BILITOT 0.7 0.6 0.4 0.9 0.7     CBC:  Recent Labs     03/12/25  0925 05/22/24  1245 03/08/24  0941 03/01/23  1113 03/02/22  1022 08/09/21  1601 03/19/21  0857 03/31/20  1015   WBC 6.7 5.8 4.4 5.2 5.4 4.0* 5.3 5.6   HGB 15.9 15.1 14.9 15.5 15.7 15.5 15.3 16.3   HCT 47.7 43.8 44.4 45.8 47.8 44.1 46.9 48.7    160 187 211 210 212 230 214   MCV 97.7 95 95 94 98 95 97 97     COAG: No results for input(s): \"PTT\", \"INR\", \"HAUF\", \"DDIMERVTE\", \"HAPTOGLOBIN\", \"FIBRINOGEN\" in the last 50011 hours.  Cardiology Tests (personally reviewed):    EKG: Sinus rhythm, isolated PACs  Echocardiogram August 2024: Ejection fraction 60 to 65%, mild left atrial dilatation, thoracic aorta 3.9 cm  Holter monitor October 2024: Sinus rhythm with a minimum heart rate of 41 bpm, maximum heart rate 71 bpm, and average heart rate " 58 bpm.  Assessment/Plan   Problem List Items Addressed This Visit             ICD-10-CM    Junctional bradycardia - Primary R00.1    Paroxysmal atrial fibrillation (Multi) (Chronic) I48.0     Cali has a history of atrial fibrillation and had pulmonary vein isolation at [facility] in 2008.  He is not having atrial fibrillation at this time and overall is doing well with regard to the arrhythmia.         Aortic valve insufficiency (Chronic) I35.1     Aortic regurgitation has been moderate by echocardiogram.         SSS (sick sinus syndrome) (Multi) I49.5     The patient is experiencing symptomatic sinus node dysfunction.  His electrocardiogram shows sinus bradycardia with periods of junctional bradycardia and heart rates in the 40s.  He also has chronotropic incompetence, with difficulty increasing his heart rate during exercise and he has been highly symptomatic.  We discussed the possibility of a pacemaker for treatment of sick sinus syndrome.  Based on his young age and very active lifestyle, I feel that an atrial leadless pacemaker would be the best device for him.  The device implantation procedure and risks were discussed today.  The patient is going to consider this option and then contact me later this year when he is ready to proceed.          James C Ramicone, DO

## 2025-05-06 ENCOUNTER — TELEPHONE (OUTPATIENT)
Dept: CARDIOLOGY | Facility: CLINIC | Age: 65
End: 2025-05-06

## 2025-05-06 NOTE — TELEPHONE ENCOUNTER
Patient was told to notify Dr. Ramicone when he is ready to proceed with wireless pacemaker - patient called,  states he is ready to schedule.

## 2025-05-19 ENCOUNTER — OFFICE VISIT (OUTPATIENT)
Dept: URGENT CARE | Age: 65
End: 2025-05-19
Payer: MEDICARE

## 2025-05-19 VITALS
TEMPERATURE: 97.7 F | RESPIRATION RATE: 20 BRPM | DIASTOLIC BLOOD PRESSURE: 63 MMHG | SYSTOLIC BLOOD PRESSURE: 146 MMHG | HEART RATE: 48 BPM | OXYGEN SATURATION: 98 %

## 2025-05-19 DIAGNOSIS — A46 ERYSIPELAS: Primary | ICD-10-CM

## 2025-05-19 RX ORDER — DOXYCYCLINE 100 MG/1
100 CAPSULE ORAL 2 TIMES DAILY
Qty: 20 CAPSULE | Refills: 0 | Status: SHIPPED | OUTPATIENT
Start: 2025-05-19 | End: 2025-05-29

## 2025-05-19 ASSESSMENT — PAIN SCALES - GENERAL: PAINLEVEL_OUTOF10: 0-NO PAIN

## 2025-05-19 NOTE — PROGRESS NOTES
Subjective   Patient ID: Cali Jones is a 64 y.o. male. They present today with a chief complaint of Rash (Pt states he pulled multiple ticks off B/L legs X 2 days ago. ).    History of Present Illness    Rash      Patient presents to the urgent care for a chief complaint of erythematous lesion on left upper thigh over the last 2 days patient reports was in 1 area did have to remove tick, patient denies removing tick in his left upper thigh reports may be just another bug bite, no rapidly expanding erythema no pain no fevers chills no vomiting or diarrhea  Past Medical History  Allergies as of 05/19/2025 - Reviewed 05/19/2025   Allergen Reaction Noted    Bee venom protein (honey bee) Unknown 04/27/2023       Prescriptions Prior to Admission[1]     Medical History[2]    Surgical History[3]     reports that he has never smoked. He has never been exposed to tobacco smoke. He has never used smokeless tobacco. He reports that he does not currently use alcohol. He reports that he does not use drugs.    Review of Systems  Review of Systems   Skin:  Positive for rash.                                  Objective    Vitals:    05/19/25 0823   BP: 146/63   Pulse: (!) 48   Resp: 20   Temp: 36.5 °C (97.7 °F)   SpO2: 98%     No LMP for male patient.    Physical Exam  Vitals and nursing note reviewed.   Constitutional:       General: He is not in acute distress.     Appearance: Normal appearance. He is not ill-appearing, toxic-appearing or diaphoretic.   HENT:      Head: Normocephalic and atraumatic.   Cardiovascular:      Rate and Rhythm: Bradycardia present.   Pulmonary:      Effort: Pulmonary effort is normal. No respiratory distress.      Breath sounds: Normal breath sounds.   Skin:     Findings: Erythema present.      Comments: Upon examination of left upper thigh there is the presence of a area of erythema with nidus believed to be bug bite of some sort, no streaking no purulent discharge nontender to palpation, with use  of tea and gentle lightening do not see any retained foreign mouthparts, well-defined margins that do vy with palpation   Neurological:      General: No focal deficit present.      Mental Status: He is alert and oriented to person, place, and time.   Psychiatric:         Mood and Affect: Mood normal.         Behavior: Behavior normal.         Procedures    Point of Care Test & Imaging Results from this visit  No results found for this visit on 05/19/25.   Imaging  No results found.    Cardiology, Vascular, and Other Imaging  No other imaging results found for the past 2 days      Diagnostic study results (if any) were reviewed by Orestes Candelario PA-C.    Assessment/Plan   Allergies, medications, history, and pertinent labs/EKGs/Imaging reviewed by Orestes Candelario PA-C.     Medical Decision Making  Patient replaced on doxycycline also several ointment packets of bacitracin provided, I did discuss with patient any rapidly expanding erythema, streaking or if develops bull's-eye/target lesion patient is to go to the ED although I did discuss with patient that suspect more erysipelas and has no known tick removal at this time do not suspect Lyme's disease.  Patient verbalized understanding is agreeable to plan discharge emergent care A+O x 4 stable condition no signs of distress    Orders and Diagnoses  Diagnoses and all orders for this visit:  Erysipelas  -     doxycycline (Vibramycin) 100 mg capsule; Take 1 capsule (100 mg) by mouth 2 times a day for 10 days. Take with at least 8 ounces (large glass) of water, do not lie down for 30 minutes after      Medical Admin Record      Patient disposition: Home    Electronically signed by Orestes Candelario PA-C  8:42 AM           [1] (Not in a hospital admission)   [2]   Past Medical History:  Diagnosis Date    A-fib (Multi)     low dose asa, no issues in 13 years    Agatston coronary artery calcium score less than 100 01/28/2025 1/14/2025 = 0      Aortic valve  "insufficiency 04/27/2023    Aortic valve regurgitation     moderate, last echo 8/16/23    Arthritis     chronic pain past 20 years - prednisone prn    BPH (benign prostatic hyperplasia)     Diabetes mellitus, type 2 (Multi) 10/03/2023    Fibromyalgia     chronic pain\"all over\" takes prednisone as needed    GERD (gastroesophageal reflux disease)     under control    Hyperlipidemia 04/27/2023    Dr. Bowman follows ... CAC = 0       Migraines     resolved per patient    Noninfective gastroenteritis and colitis, unspecified     Colitis    DICK (obstructive sleep apnea)     had boderline sleep study, no CPAP    Paroxysmal atrial fibrillation (Multi) 04/27/2023    s/p A-fib ablation at [facility] 2008   Follows with Dr. James Ramicone  Not on AC d/t his choice   Has occasional break-through episodes       Thyroiditis     yearly ultrasound - no present medication    Ulcerative colitis     managed for last 50 years on Delzical   [3]   Past Surgical History:  Procedure Laterality Date    ABLATION OF DYSRHYTHMIC FOCUS      HEMORRHOID SURGERY      ULNAR NERVE TRANSPOSITION Right      " 27-Apr-2023 18:54

## 2025-05-20 ENCOUNTER — TELEPHONE (OUTPATIENT)
Dept: URGENT CARE | Age: 65
End: 2025-05-20

## 2025-05-21 ENCOUNTER — APPOINTMENT (OUTPATIENT)
Dept: CARDIOLOGY | Facility: CLINIC | Age: 65
End: 2025-05-21
Payer: MEDICARE

## 2025-06-19 ENCOUNTER — TELEPHONE (OUTPATIENT)
Dept: CARDIOLOGY | Facility: CLINIC | Age: 65
End: 2025-06-19
Payer: MEDICARE

## 2025-06-19 DIAGNOSIS — I49.5 SSS (SICK SINUS SYNDROME) (MULTI): ICD-10-CM

## 2025-06-19 NOTE — TELEPHONE ENCOUNTER
Spoke with patient and he's scheduled for 7/22 for his procedure. Also let patient know what once it's on the schedule, the pre-cert team will work on getting the authorization.

## 2025-06-19 NOTE — TELEPHONE ENCOUNTER
Left message for patient to call back. Would like to offer 7/22 @ 1:00 for procedure.     Once procedure is scheduled, then the pre-cert dept will work on getting the authorization

## 2025-07-22 ENCOUNTER — APPOINTMENT (OUTPATIENT)
Dept: CARDIOLOGY | Facility: HOSPITAL | Age: 65
End: 2025-07-22
Payer: MEDICARE

## 2025-07-22 ENCOUNTER — HOSPITAL ENCOUNTER (OUTPATIENT)
Facility: HOSPITAL | Age: 65
Discharge: HOME | End: 2025-07-23
Attending: INTERNAL MEDICINE | Admitting: INTERNAL MEDICINE
Payer: MEDICARE

## 2025-07-22 DIAGNOSIS — I49.5 SSS (SICK SINUS SYNDROME) (MULTI): ICD-10-CM

## 2025-07-22 DIAGNOSIS — I49.5 SSS (SICK SINUS SYNDROME) (MULTI): Primary | ICD-10-CM

## 2025-07-22 LAB
ANION GAP SERPL CALC-SCNC: 10 MMOL/L (ref 10–20)
APTT PPP: 33 SECONDS (ref 26–36)
BUN SERPL-MCNC: 29 MG/DL (ref 6–23)
CALCIUM SERPL-MCNC: 9.2 MG/DL (ref 8.6–10.3)
CHLORIDE SERPL-SCNC: 108 MMOL/L (ref 98–107)
CO2 SERPL-SCNC: 27 MMOL/L (ref 21–32)
CREAT SERPL-MCNC: 0.91 MG/DL (ref 0.5–1.3)
EGFRCR SERPLBLD CKD-EPI 2021: >90 ML/MIN/1.73M*2
ERYTHROCYTE [DISTWIDTH] IN BLOOD BY AUTOMATED COUNT: 12.1 % (ref 11.5–14.5)
GLUCOSE BLD MANUAL STRIP-MCNC: 128 MG/DL (ref 74–99)
GLUCOSE BLD MANUAL STRIP-MCNC: 89 MG/DL (ref 74–99)
GLUCOSE SERPL-MCNC: 82 MG/DL (ref 74–99)
HCT VFR BLD AUTO: 44.5 % (ref 41–52)
HGB BLD-MCNC: 15 G/DL (ref 13.5–17.5)
INR PPP: 0.9 (ref 0.9–1.1)
MCH RBC QN AUTO: 32.5 PG (ref 26–34)
MCHC RBC AUTO-ENTMCNC: 33.7 G/DL (ref 32–36)
MCV RBC AUTO: 97 FL (ref 80–100)
NRBC BLD-RTO: 0 /100 WBCS (ref 0–0)
PLATELET # BLD AUTO: 164 X10*3/UL (ref 150–450)
POTASSIUM SERPL-SCNC: 4.1 MMOL/L (ref 3.5–5.3)
PROTHROMBIN TIME: 10.1 SECONDS (ref 9.8–12.4)
RBC # BLD AUTO: 4.61 X10*6/UL (ref 4.5–5.9)
SODIUM SERPL-SCNC: 141 MMOL/L (ref 136–145)
WBC # BLD AUTO: 4.9 X10*3/UL (ref 4.4–11.3)

## 2025-07-22 PROCEDURE — 7100000011 HC EXTENDED STAY RECOVERY HOURLY - NURSING UNIT

## 2025-07-22 PROCEDURE — 0823T TCAT INS 1CHMBR LDLS PM RA: CPT | Performed by: INTERNAL MEDICINE

## 2025-07-22 PROCEDURE — 80048 BASIC METABOLIC PNL TOTAL CA: CPT | Performed by: NURSE PRACTITIONER

## 2025-07-22 PROCEDURE — 99153 MOD SED SAME PHYS/QHP EA: CPT | Performed by: INTERNAL MEDICINE

## 2025-07-22 PROCEDURE — 2750000001 HC OR 275 NO HCPCS: Performed by: INTERNAL MEDICINE

## 2025-07-22 PROCEDURE — 93005 ELECTROCARDIOGRAM TRACING: CPT | Mod: 59

## 2025-07-22 PROCEDURE — 93005 ELECTROCARDIOGRAM TRACING: CPT

## 2025-07-22 PROCEDURE — C1893 INTRO/SHEATH, FIXED,NON-PEEL: HCPCS | Performed by: INTERNAL MEDICINE

## 2025-07-22 PROCEDURE — 7100000001 HC RECOVERY ROOM TIME - INITIAL BASE CHARGE: Performed by: INTERNAL MEDICINE

## 2025-07-22 PROCEDURE — 85027 COMPLETE CBC AUTOMATED: CPT | Performed by: NURSE PRACTITIONER

## 2025-07-22 PROCEDURE — C1769 GUIDE WIRE: HCPCS | Performed by: INTERNAL MEDICINE

## 2025-07-22 PROCEDURE — 99152 MOD SED SAME PHYS/QHP 5/>YRS: CPT | Performed by: INTERNAL MEDICINE

## 2025-07-22 PROCEDURE — C1786 PMKR, SINGLE, RATE-RESP: HCPCS | Performed by: INTERNAL MEDICINE

## 2025-07-22 PROCEDURE — 7100000002 HC RECOVERY ROOM TIME - EACH INCREMENTAL 1 MINUTE: Performed by: INTERNAL MEDICINE

## 2025-07-22 PROCEDURE — 2720000007 HC OR 272 NO HCPCS: Performed by: INTERNAL MEDICINE

## 2025-07-22 PROCEDURE — 82947 ASSAY GLUCOSE BLOOD QUANT: CPT | Mod: 59

## 2025-07-22 PROCEDURE — 2500000004 HC RX 250 GENERAL PHARMACY W/ HCPCS (ALT 636 FOR OP/ED): Performed by: INTERNAL MEDICINE

## 2025-07-22 PROCEDURE — C1894 INTRO/SHEATH, NON-LASER: HCPCS | Performed by: INTERNAL MEDICINE

## 2025-07-22 PROCEDURE — 36415 COLL VENOUS BLD VENIPUNCTURE: CPT | Performed by: NURSE PRACTITIONER

## 2025-07-22 PROCEDURE — 93010 ELECTROCARDIOGRAM REPORT: CPT | Performed by: INTERNAL MEDICINE

## 2025-07-22 PROCEDURE — 2500000004 HC RX 250 GENERAL PHARMACY W/ HCPCS (ALT 636 FOR OP/ED): Performed by: NURSE PRACTITIONER

## 2025-07-22 PROCEDURE — 85610 PROTHROMBIN TIME: CPT | Performed by: NURSE PRACTITIONER

## 2025-07-22 PROCEDURE — C1730 CATH, EP, 19 OR FEW ELECT: HCPCS | Performed by: INTERNAL MEDICINE

## 2025-07-22 DEVICE — DELIVERY SYSTEM, PACEMAKER LEADLESS, AVEIR DR: Type: IMPLANTABLE DEVICE | Site: GROIN | Status: NON-FUNCTIONAL

## 2025-07-22 DEVICE — LEADLESS PACEMAKER
Type: IMPLANTABLE DEVICE | Site: HEART | Status: FUNCTIONAL
Brand: AVEIR™

## 2025-07-22 RX ORDER — NALOXONE HYDROCHLORIDE 1 MG/ML
0.2 INJECTION INTRAMUSCULAR; INTRAVENOUS; SUBCUTANEOUS EVERY 5 MIN PRN
Status: DISCONTINUED | OUTPATIENT
Start: 2025-07-22 | End: 2025-07-23 | Stop reason: HOSPADM

## 2025-07-22 RX ORDER — FENTANYL CITRATE 50 UG/ML
INJECTION, SOLUTION INTRAMUSCULAR; INTRAVENOUS AS NEEDED
Status: DISCONTINUED | OUTPATIENT
Start: 2025-07-22 | End: 2025-07-22 | Stop reason: HOSPADM

## 2025-07-22 RX ORDER — LIDOCAINE HYDROCHLORIDE 20 MG/ML
INJECTION, SOLUTION INFILTRATION; PERINEURAL AS NEEDED
Status: DISCONTINUED | OUTPATIENT
Start: 2025-07-22 | End: 2025-07-22 | Stop reason: HOSPADM

## 2025-07-22 RX ORDER — TRAMADOL HYDROCHLORIDE 50 MG/1
50 TABLET, FILM COATED ORAL EVERY 6 HOURS PRN
Status: DISCONTINUED | OUTPATIENT
Start: 2025-07-22 | End: 2025-07-23 | Stop reason: HOSPADM

## 2025-07-22 RX ORDER — HEPARIN SODIUM 1000 [USP'U]/ML
INJECTION, SOLUTION INTRAVENOUS; SUBCUTANEOUS AS NEEDED
Status: DISCONTINUED | OUTPATIENT
Start: 2025-07-22 | End: 2025-07-22 | Stop reason: HOSPADM

## 2025-07-22 RX ORDER — SODIUM CHLORIDE 9 MG/ML
INJECTION, SOLUTION INTRAVENOUS CONTINUOUS PRN
Status: DISCONTINUED | OUTPATIENT
Start: 2025-07-22 | End: 2025-07-22 | Stop reason: HOSPADM

## 2025-07-22 RX ORDER — CEFAZOLIN SODIUM 2 G/50ML
2 SOLUTION INTRAVENOUS ONCE
Status: COMPLETED | OUTPATIENT
Start: 2025-07-22 | End: 2025-07-22

## 2025-07-22 RX ORDER — ACETAMINOPHEN 650 MG/1
650 SUPPOSITORY RECTAL EVERY 4 HOURS PRN
Status: DISCONTINUED | OUTPATIENT
Start: 2025-07-22 | End: 2025-07-23 | Stop reason: HOSPADM

## 2025-07-22 RX ORDER — ACETAMINOPHEN 160 MG/5ML
650 SOLUTION ORAL EVERY 4 HOURS PRN
Status: DISCONTINUED | OUTPATIENT
Start: 2025-07-22 | End: 2025-07-23 | Stop reason: HOSPADM

## 2025-07-22 RX ORDER — CHOLECALCIFEROL (VITAMIN D3) 25 MCG
25 TABLET ORAL DAILY
Status: DISCONTINUED | OUTPATIENT
Start: 2025-07-23 | End: 2025-07-23 | Stop reason: HOSPADM

## 2025-07-22 RX ORDER — MESALAMINE 1.2 G/1
4.8 TABLET, DELAYED RELEASE ORAL
Status: DISCONTINUED | OUTPATIENT
Start: 2025-07-23 | End: 2025-07-23 | Stop reason: HOSPADM

## 2025-07-22 RX ORDER — ACETAMINOPHEN 325 MG/1
650 TABLET ORAL EVERY 4 HOURS PRN
Status: DISCONTINUED | OUTPATIENT
Start: 2025-07-22 | End: 2025-07-23 | Stop reason: HOSPADM

## 2025-07-22 RX ORDER — MIDAZOLAM HYDROCHLORIDE 1 MG/ML
INJECTION, SOLUTION INTRAMUSCULAR; INTRAVENOUS AS NEEDED
Status: DISCONTINUED | OUTPATIENT
Start: 2025-07-22 | End: 2025-07-22 | Stop reason: HOSPADM

## 2025-07-22 RX ADMIN — CEFAZOLIN SODIUM 2 G: 2 SOLUTION INTRAVENOUS at 13:04

## 2025-07-22 SDOH — SOCIAL STABILITY: SOCIAL INSECURITY: DO YOU FEEL ANYONE HAS EXPLOITED OR TAKEN ADVANTAGE OF YOU FINANCIALLY OR OF YOUR PERSONAL PROPERTY?: NO

## 2025-07-22 SDOH — SOCIAL STABILITY: SOCIAL INSECURITY: ABUSE: ADULT

## 2025-07-22 SDOH — ECONOMIC STABILITY: HOUSING INSECURITY: DO YOU FEEL UNSAFE GOING BACK TO THE PLACE WHERE YOU LIVE?: NO

## 2025-07-22 SDOH — SOCIAL STABILITY: SOCIAL INSECURITY: HAVE YOU HAD THOUGHTS OF HARMING ANYONE ELSE?: NO

## 2025-07-22 SDOH — SOCIAL STABILITY: SOCIAL INSECURITY: ARE YOU OR HAVE YOU BEEN THREATENED OR ABUSED PHYSICALLY, EMOTIONALLY, OR SEXUALLY BY ANYONE?: NO

## 2025-07-22 SDOH — SOCIAL STABILITY: SOCIAL INSECURITY: DO YOU FEEL UNSAFE GOING BACK TO THE PLACE WHERE YOU ARE LIVING?: NO

## 2025-07-22 SDOH — SOCIAL STABILITY: SOCIAL INSECURITY: ARE THERE ANY APPARENT SIGNS OF INJURIES/BEHAVIORS THAT COULD BE RELATED TO ABUSE/NEGLECT?: NO

## 2025-07-22 SDOH — SOCIAL STABILITY: SOCIAL INSECURITY: HAVE YOU HAD ANY THOUGHTS OF HARMING ANYONE ELSE?: NO

## 2025-07-22 SDOH — SOCIAL STABILITY: SOCIAL INSECURITY: DOES ANYONE TRY TO KEEP YOU FROM HAVING/CONTACTING OTHER FRIENDS OR DOING THINGS OUTSIDE YOUR HOME?: NO

## 2025-07-22 SDOH — SOCIAL STABILITY: SOCIAL INSECURITY: HAS ANYONE EVER THREATENED TO HURT YOUR FAMILY OR YOUR PETS?: NO

## 2025-07-22 SDOH — SOCIAL STABILITY: SOCIAL INSECURITY: WERE YOU ABLE TO COMPLETE ALL THE BEHAVIORAL HEALTH SCREENINGS?: YES

## 2025-07-22 SDOH — SOCIAL STABILITY: SOCIAL INSECURITY
ASK PARENT OR GUARDIAN: ARE THERE TIMES WHEN YOU, YOUR CHILD(REN), OR ANY MEMBER OF YOUR HOUSEHOLD FEEL UNSAFE, HARMED, OR THREATENED AROUND PERSONS WITH WHOM YOU KNOW OR LIVE?: NO

## 2025-07-22 ASSESSMENT — ACTIVITIES OF DAILY LIVING (ADL)
JUDGMENT_ADEQUATE_SAFELY_COMPLETE_DAILY_ACTIVITIES: YES
BATHING: INDEPENDENT
WALKS IN HOME: INDEPENDENT
PATIENT'S MEMORY ADEQUATE TO SAFELY COMPLETE DAILY ACTIVITIES?: YES
DRESSING YOURSELF: INDEPENDENT
TOILETING: INDEPENDENT
HEARING - LEFT EAR: FUNCTIONAL
ADEQUATE_TO_COMPLETE_ADL: YES
FEEDING YOURSELF: INDEPENDENT
GROOMING: INDEPENDENT
ASSISTIVE_DEVICE: EYEGLASSES
HEARING - RIGHT EAR: FUNCTIONAL

## 2025-07-22 ASSESSMENT — LIFESTYLE VARIABLES
HOW OFTEN DO YOU HAVE 6 OR MORE DRINKS ON ONE OCCASION: NEVER
AUDIT-C TOTAL SCORE: 0
SKIP TO QUESTIONS 9-10: 1
HOW MANY STANDARD DRINKS CONTAINING ALCOHOL DO YOU HAVE ON A TYPICAL DAY: PATIENT DOES NOT DRINK
AUDIT-C TOTAL SCORE: 0
HOW OFTEN DO YOU HAVE A DRINK CONTAINING ALCOHOL: NEVER

## 2025-07-22 ASSESSMENT — PATIENT HEALTH QUESTIONNAIRE - PHQ9
2. FEELING DOWN, DEPRESSED OR HOPELESS: NOT AT ALL
1. LITTLE INTEREST OR PLEASURE IN DOING THINGS: NOT AT ALL
SUM OF ALL RESPONSES TO PHQ9 QUESTIONS 1 & 2: 0

## 2025-07-22 ASSESSMENT — ENCOUNTER SYMPTOMS
DIZZINESS: 0
PALPITATIONS: 0
FATIGUE: 1
SHORTNESS OF BREATH: 1
CHILLS: 0
ABDOMINAL PAIN: 0
BACK PAIN: 0
WOUND: 0

## 2025-07-22 ASSESSMENT — PAIN - FUNCTIONAL ASSESSMENT
PAIN_FUNCTIONAL_ASSESSMENT: 0-10

## 2025-07-22 ASSESSMENT — PAIN SCALES - GENERAL
PAINLEVEL_OUTOF10: 0 - NO PAIN

## 2025-07-22 ASSESSMENT — COGNITIVE AND FUNCTIONAL STATUS - GENERAL
DAILY ACTIVITIY SCORE: 24
MOBILITY SCORE: 24
PATIENT BASELINE BEDBOUND: NO

## 2025-07-22 ASSESSMENT — COLUMBIA-SUICIDE SEVERITY RATING SCALE - C-SSRS
1. IN THE PAST MONTH, HAVE YOU WISHED YOU WERE DEAD OR WISHED YOU COULD GO TO SLEEP AND NOT WAKE UP?: NO
6. HAVE YOU EVER DONE ANYTHING, STARTED TO DO ANYTHING, OR PREPARED TO DO ANYTHING TO END YOUR LIFE?: NO
2. HAVE YOU ACTUALLY HAD ANY THOUGHTS OF KILLING YOURSELF?: NO

## 2025-07-22 NOTE — POST-PROCEDURE NOTE
Physician Transition of Care Summary  Invasive Cardiovascular Lab    Procedure Date: 7/22/2025  Attending:    * James C Ramicone - Primary  Resident/Fellow/Other Assistant: Surgeons and Role:  * No surgeons found with a matching role *    Indications:   Pre-op Diagnosis      * SSS (sick sinus syndrome) (Multi) [I49.5]    Post-procedure diagnosis:   Post-op Diagnosis     * SSS (sick sinus syndrome) (Multi) [I49.5]    Procedure(s):   PPM Leadless Implant/ABBOTT  0823T - AL TCAT INSJ PERM 1CHMBR LDLS PACEMAKER R ATRIAL        Procedure Findings:       Description of the Procedure:   Abbott leadless pacemaker insertion for treatment of symptomatic sinus bradycardia    Complications:   None    Stents/Implants:   Implants          Pacemaker          Delivery System, Pacemaker Leadless, Aveir Dr - [id number] - Used, Not Implanted        Inventory item: DELIVERY SYSTEM, PACEMAKER LEADLESS, AVEIR DR Model/Cat number: [id number]    : [employer] Lot number: [id number]    Implant Date: 7/22/2025 Description: Delivery system      As of 7/22/2025       Status: Used, Not Implanted                        Pacemaker, Aveir Dr Pm Leadless, 19.5f 32.2mm Ra - [id number] - Implanted        Inventory item: PACEMAKER, AVEIR DR PM LEADLESS, 19.5F 32.2MM RA Model/Cat number: [id number]    Serial number: [id number] : [employer]    Lot number: [id number] Device identifier: [id number]    Implant Date: 7/22/2025        GUDID Information       Request status Successful        Brand name: Aveir™ Version/Model: [id number]    Company name: [employer] MRI safety info as of 7/22/25: MR Conditional    Contains dry or latex rubber: No      GMDN P.T. name: Intracardiac pacemaker                As of 7/22/2025       Status: Implanted                              Anticoagulation/Antiplatelet Plan:   None    Estimated Blood Loss:   * No values recorded between 7/22/2025 12:48 PM and 7/22/2025  2:27 PM *    Anesthesia:  Moderate Sedation Anesthesia Staff: No anesthesia staff entered.    Any Specimen(s) Removed:   Order Name Source Comment Collection Info Order Time   COAGULATION SCREEN Blood, Venous  Collected By: Vi Lo RN 7/22/2025 10:59 AM     Release result to [facility]   Immediate        BASIC METABOLIC PANEL Blood, Venous  Collected By: Vi Lo RN 7/22/2025 10:59 AM     Release result to [facility]   Immediate        CBC Blood, Venous  Collected By: Vi Lo RN 7/22/2025 10:59 AM     Release result to [facility]   Immediate            Disposition:   Monitor under extended recovery      Electronically signed by: James C Ramicone, DO, 7/22/2025 2:27 PM

## 2025-07-22 NOTE — H&P
History and Physical   Pre Surgical Review (> 30 days)      Subjective   This is a 64 y.o. male with a PMH significant for atrial fibrillation, HLD, DM and bradycardia.  The patient has been experiencing symptomatic bradycardia with fatigue and shortness of breath.  His EKG showed sinus bradycardia with periods of junctional bradycardia with heart rates in the 40s. The patient presents to [facility] today, 7/22/2025 for permanent pacemaker placement with Dr. Ramicone.     PMH:  atrial fibrillation, HLD, DM, SSS, vitamin D deficiency, vitamin B12 deficiency, BPH, fibromyalgia, DICK  PSH:  hemorrhoid surgery, ablation  Family history:  Mother - TIA.  Father - MI.  Sister - breast CA.  Social history:  Never a smoker, denies alcohol and illicit drug use      Review of Systems  Review of Systems   Constitutional:  Positive for fatigue. Negative for chills.   Respiratory:  Positive for shortness of breath.    Cardiovascular:  Negative for chest pain, palpitations and leg swelling.   Gastrointestinal:  Negative for abdominal pain.   Musculoskeletal:  Negative for back pain.   Skin:  Negative for rash and wound.   Neurological:  Negative for dizziness and syncope.         Objective   Temp:  [35.9 °C (96.6 °F)] 35.9 °C (96.6 °F)  Heart Rate:  [50] 50  Resp:  [146] 146  BP: (128)/(66) 128/66    Allergies  RX Allergies[1]    Home Medications  Current Outpatient Medications   Medication Instructions    ascorbic acid (VITAMIN C) 500 mg, Daily    cholecalciferol (VITAMIN D3) 25 mcg, Daily    docosahexaenoic acid/epa (FISH OIL ORAL) 1 tablet, Daily    EPINEPHrine (EPIPEN) 0.3 mg    mesalamine (Lialda) 1.2 gram EC tablet TAKE FOUR (4) TABLETS BY MOUTH EVERY DAY WITH FOOD. DO NOT CUT TABLET    methylPREDNISolone (Medrol, Marshall,) 4 mg tablets Follow schedule on package instructions    milk thistle 175 mg, Daily    multivitamin tablet 1 tablet, Daily    predniSONE (DELTASONE) 5 mg, As needed    zinc gluconate 50 mg tablet Daily         Physical Exam  Physical Exam  Constitutional:       General: He is not in acute distress.    Cardiovascular:      Rate and Rhythm: Bradycardia present.      Comments: + pulses all extremities.  Pulmonary:      Effort: Pulmonary effort is normal. No respiratory distress.      Comments: Clear bilaterally.  Abdominal:      Comments: + BS.     Skin:     General: Skin is warm and dry.     Neurological:      General: No focal deficit present.      Mental Status: He is alert and oriented to person, place, and time.     Psychiatric:         Mood and Affect: Mood normal.         Behavior: Behavior normal.        Airway/Sedation Assessment     Assessment by anesthesia N/A     ·  Mouth Opening OK yes      Neck Flexibility OK yes      Loose Teeth No   ·  Oropharyngeal Classification Grade II   ·  ASA PS Classification ASA III - Patient with severe systemic disease       Sedation Plan Moderate     Risks, benefits, and alternatives discussed with patient.     ERAS (Enhanced Recovery After Surgery):  ·  ERAS Patient: No      Consent:   COVID-19 Consent:  ·  COVID-19 Risk Consent Surgeon has reviewed key risks related to the risk of joann COVID-19 and if they contract COVID-19 what the risks are.       Bekah Sneed, APRN-CNP         [1]   Allergies  Allergen Reactions    Bee Venom Protein (Honey Bee) Unknown

## 2025-07-22 NOTE — DISCHARGE INSTRUCTIONS
DISCHARGE INSTRUCTIONS     FOR SUDDEN AND SEVERE CHEST PAIN, SHORTNESS OF BREATH, EXCESSIVE BLEEDING, SIGNS OF STROKE, OR CHANGES IN MENTAL STATUS YOU SHOULD CALL 911 IMMEDIATELY.     FOR NEXT 24 HOURS  - Upon discharge, you should return home and rest for the remainder of the day and evening. You do not have to stay on bed rest but should not be very active.  It is recommended a responsible adult be with you for the first 24 hours after the procedure.    - No driving for 24 hours after procedure. Please arrange for someone to drive you home from the hospital today.     - Do not drive, operate machinery, or use power tools for 24 hours after your procedure.     - Do not make any legal decisions for 24 hours after your procedure.     - Do not drink alcoholic beverages for 24 hours after your procedure.    WOUND CARE   *FOR FEMORAL (LEG) ACCESS*  ·      Avoid heavy lifting (over 10 pounds) for 7 days, squatting or excessive bending for 7 days, and strenuous exercise for 7 days.  ·      No submerged bathing, swimming, or hot tubs for the next 7 days, or until fully healed.  ·      Avoid sexual activity for 3-4 days until any groin discomfort has ceased.    - The transparent dressing should be removed from the site 24 hours after the procedure.  Wash the site gently with soap and water. Rinse well and pat dry. Keep the area clean and dry. You may apply a Band-Aid to the site. Avoid lotions, ointments, or powders until fully healed.     - You may shower the day after your procedure.      - It is normal to notice a small bruise around the puncture site and/or a small grape sized or smaller lump. Any large bruising or large lump warrants a call to the office.     - If bleeding should occur, lay down and apply pressure to the affected area for 10 minutes.  If the bleeding stops notify your physician.  If there is a large amount of bleeding or spurting of blood CALL 911 immediately.  DO NOT drive yourself to the  [facility].    - You may experience some tenderness, bruising or minimal inflammation.  If you have any concerns, you may contact the Cath Lab or if any of these symptoms become excessive, contact your cardiologist or go to the emergency room.     OTHER INSTRUCTIONS  - You may take acetaminophen (Tylenol) as directed for discomfort.  If pain is not relieved with acetaminophen (Tylenol), contact your doctor.    - If you notice or experience any of the following, you should notify your doctor or seek medical attention  Chest pain or discomfort  Change in mental status or weakness in extremities.  Dizziness, light headedness, or feeling faint.  Change in the site where the procedure was performed, such as bleeding or an increased area of bruising or swelling.  Tingling, numbness, pain, or coolness in the leg/arm beyond the site where the procedure was performed.  Signs of infection (i.e. shaking chills, temperature > 100 degrees Fahrenheit, warmth, redness) in the leg/arm area where the procedure was performed.  Changes in urination   Bloody or black stools  Vomiting blood  Severe nose bleeds  Any excessive bleeding    - Please follow up for device check 8/21/25 as scheduled or sooner if needed, [phone].

## 2025-07-22 NOTE — NURSING NOTE
Observed pt ambulating to bathroom, reminded pt of restrictions post cath lab. Although he is able to sit upright after 6:45pm, he must remain in the bed until 7:45pm. Pt verbalized understanding and in agreement. No s/s of distress noted at this time.

## 2025-07-22 NOTE — CARE PLAN
The patient's goals for the shift include      The clinical goals for the shift include Remain hemodynamically stable      Problem: Pain - Adult  Goal: Verbalizes/displays adequate comfort level or baseline comfort level  Outcome: Progressing     Problem: Safety - Adult  Goal: Free from fall injury  Outcome: Progressing     Problem: Discharge Planning  Goal: Discharge to home or other facility with appropriate resources  Outcome: Progressing     Problem: Chronic Conditions and Co-morbidities  Goal: Patient's chronic conditions and co-morbidity symptoms are monitored and maintained or improved  Outcome: Progressing

## 2025-07-23 ENCOUNTER — HOSPITAL ENCOUNTER (OUTPATIENT)
Dept: CARDIOLOGY | Facility: CLINIC | Age: 65
Discharge: HOME | End: 2025-07-23
Payer: MEDICARE

## 2025-07-23 VITALS
WEIGHT: 173.28 LBS | TEMPERATURE: 97.7 F | SYSTOLIC BLOOD PRESSURE: 153 MMHG | OXYGEN SATURATION: 96 % | BODY MASS INDEX: 24.81 KG/M2 | DIASTOLIC BLOOD PRESSURE: 66 MMHG | HEIGHT: 70 IN | RESPIRATION RATE: 14 BRPM | HEART RATE: 62 BPM

## 2025-07-23 DIAGNOSIS — Z95.0 PRESENCE OF CARDIAC PACEMAKER: ICD-10-CM

## 2025-07-23 DIAGNOSIS — I49.5 SSS (SICK SINUS SYNDROME) (MULTI): ICD-10-CM

## 2025-07-23 PROCEDURE — 99238 HOSP IP/OBS DSCHRG MGMT 30/<: CPT | Performed by: NURSE PRACTITIONER

## 2025-07-23 PROCEDURE — 7100000011 HC EXTENDED STAY RECOVERY HOURLY - NURSING UNIT

## 2025-07-23 PROCEDURE — 2500000001 HC RX 250 WO HCPCS SELF ADMINISTERED DRUGS (ALT 637 FOR MEDICARE OP): Performed by: NURSE PRACTITIONER

## 2025-07-23 RX ADMIN — Medication 25 MCG: at 08:35

## 2025-07-23 RX ADMIN — MESALAMINE 4.8 G: 1.2 TABLET, DELAYED RELEASE ORAL at 08:35

## 2025-07-23 ASSESSMENT — COGNITIVE AND FUNCTIONAL STATUS - GENERAL
DAILY ACTIVITIY SCORE: 24
MOBILITY SCORE: 24

## 2025-07-23 NOTE — PROGRESS NOTES
07/23/25 1039   Discharge Planning   Support Systems Family members   Type of Residence Private residence   Expected Discharge Disposition Home   Does the patient need discharge transport arranged? No   Intensity of Service   Intensity of Service 0-30 min     I met with patient at the bedside.  He does not use mobility aids and is independent with all ADLs.  He declined home health care, denies homegoing needs.  Patient has discharge orders and his siter will be picking him up.  Elder ALDRIDGE [facility]

## 2025-07-26 LAB
ATRIAL RATE: 50 BPM
ATRIAL RATE: 54 BPM
P AXIS: 66 DEGREES
P OFFSET: 141 MS
P ONSET: 101 MS
PR INTERVAL: 238 MS
PR INTERVAL: 300 MS
Q ONSET: 216 MS
Q ONSET: 220 MS
QRS COUNT: 9 BEATS
QRS COUNT: 9 BEATS
QRS DURATION: 90 MS
QRS DURATION: 90 MS
QT INTERVAL: 456 MS
QT INTERVAL: 480 MS
QTC CALCULATION(BAZETT): 432 MS
QTC CALCULATION(BAZETT): 437 MS
QTC FREDERICIA: 440 MS
QTC FREDERICIA: 452 MS
R AXIS: 39 DEGREES
R AXIS: 52 DEGREES
T AXIS: 104 DEGREES
T AXIS: 93 DEGREES
T OFFSET: 444 MS
T OFFSET: 460 MS
VENTRICULAR RATE: 50 BPM
VENTRICULAR RATE: 54 BPM

## 2025-09-02 ENCOUNTER — TELEPHONE (OUTPATIENT)
Dept: CARDIOLOGY | Facility: CLINIC | Age: 65
End: 2025-09-02
Payer: MEDICARE

## 2025-09-02 ENCOUNTER — HOSPITAL ENCOUNTER (OUTPATIENT)
Dept: CARDIOLOGY | Facility: CLINIC | Age: 65
Discharge: HOME | End: 2025-09-02
Payer: MEDICARE

## 2025-09-02 DIAGNOSIS — I49.5 SSS (SICK SINUS SYNDROME) (MULTI): ICD-10-CM

## 2025-09-02 PROCEDURE — 93279 PRGRMG DEV EVAL PM/LDLS PM: CPT

## 2025-10-15 ENCOUNTER — APPOINTMENT (OUTPATIENT)
Dept: CARDIOLOGY | Facility: CLINIC | Age: 65
End: 2025-10-15
Payer: MEDICARE

## 2026-03-13 ENCOUNTER — APPOINTMENT (OUTPATIENT)
Dept: PRIMARY CARE | Facility: CLINIC | Age: 66
End: 2026-03-13
Payer: MEDICARE

## (undated) DEVICE — Device

## (undated) DEVICE — DILATOR, VESSEL, COONS, 22FR X 20CM

## (undated) DEVICE — CUFF, TOURNIQUET, 30 X 4, DUAL PORT/SNGL BLADDER, DISP, LF

## (undated) DEVICE — MAT, FLOOR, SURGISAFE, FLUID CONTROL, 46X40

## (undated) DEVICE — TUBING, PATIENT 8FT STERILE

## (undated) DEVICE — SHEATH, PINNACLE, W/.038 GUIDEWIRE, 10 CM,  8FR INTRODUCER, 8FR DIA, 2.5 CM DIALATOR

## (undated) DEVICE — INTRODUCER, AVEIR 25F, 50 CM

## (undated) DEVICE — GLOVE, SURGICAL, PROTEXIS PI MICRO, 8.0, PF, LF

## (undated) DEVICE — SYRINGE, LUER LOCK, 12ML

## (undated) DEVICE — GLOVE, SURGICAL, PROTEXIS PI W/NEU-THERA, 8.0, PF, LF

## (undated) DEVICE — ELECTRODE, MULTIFUNCTION, QUICK-COMBO, EDGE SYSTEM, 2 FT

## (undated) DEVICE — DRAPE, SHEET, ARTHROSCOPY, W/POUCH, 92 X 102 IN, DISPOSABLE, LF, STERILE

## (undated) DEVICE — GUIDEWIRE, AMPLATZ, TFE, EXTRA STIFF, CURVED, .032/145CM/3MMTIP

## (undated) DEVICE — CATHETER, SUPREME EP, HEXAPOLAR, UNIPOLAR SPACING, 5-5-175-175MM JSN CURVE

## (undated) DEVICE — SUTURE, ETHILON, 4-0, BLK, MONO, PS-2 18

## (undated) DEVICE — ACCESS KIT, MINI MAK, 4FR X 10CML, 0.018 X 40CM, SS/SS, ECHO ENHANCED 7CM NDL

## (undated) DEVICE — BANDAGE, ESMARK, 6 IN X 9 FT, STERILE

## (undated) DEVICE — TUBING, PUMP REDEUCE 8FT STERILE

## (undated) DEVICE — KIT, MINOR, DOUBLE BASIN